# Patient Record
Sex: FEMALE | Race: BLACK OR AFRICAN AMERICAN | NOT HISPANIC OR LATINO | Employment: FULL TIME | URBAN - METROPOLITAN AREA
[De-identification: names, ages, dates, MRNs, and addresses within clinical notes are randomized per-mention and may not be internally consistent; named-entity substitution may affect disease eponyms.]

---

## 2018-01-05 ENCOUNTER — OFFICE VISIT CONVERTED (OUTPATIENT)
Dept: FAMILY MEDICINE CLINIC | Facility: CLINIC | Age: 23
End: 2018-01-05
Attending: FAMILY MEDICINE

## 2018-04-23 ENCOUNTER — OFFICE VISIT CONVERTED (OUTPATIENT)
Dept: INTERNAL MEDICINE | Facility: CLINIC | Age: 23
End: 2018-04-23
Attending: INTERNAL MEDICINE

## 2018-07-26 ENCOUNTER — OFFICE VISIT CONVERTED (OUTPATIENT)
Dept: GASTROENTEROLOGY | Facility: CLINIC | Age: 23
End: 2018-07-26
Attending: INTERNAL MEDICINE

## 2018-10-29 ENCOUNTER — OFFICE VISIT CONVERTED (OUTPATIENT)
Dept: INTERNAL MEDICINE | Facility: CLINIC | Age: 23
End: 2018-10-29
Attending: PHYSICIAN ASSISTANT

## 2018-10-29 ENCOUNTER — CONVERSION ENCOUNTER (OUTPATIENT)
Dept: INTERNAL MEDICINE | Facility: CLINIC | Age: 23
End: 2018-10-29

## 2018-11-29 ENCOUNTER — CONVERSION ENCOUNTER (OUTPATIENT)
Dept: GASTROENTEROLOGY | Facility: CLINIC | Age: 23
End: 2018-11-29

## 2018-11-29 ENCOUNTER — OFFICE VISIT CONVERTED (OUTPATIENT)
Dept: GASTROENTEROLOGY | Facility: CLINIC | Age: 23
End: 2018-11-29
Attending: INTERNAL MEDICINE

## 2019-01-02 ENCOUNTER — CONVERSION ENCOUNTER (OUTPATIENT)
Dept: INTERNAL MEDICINE | Facility: CLINIC | Age: 24
End: 2019-01-02

## 2019-01-02 ENCOUNTER — OFFICE VISIT CONVERTED (OUTPATIENT)
Dept: INTERNAL MEDICINE | Facility: CLINIC | Age: 24
End: 2019-01-02
Attending: INTERNAL MEDICINE

## 2019-04-24 ENCOUNTER — HOSPITAL ENCOUNTER (OUTPATIENT)
Dept: PERIOP | Facility: HOSPITAL | Age: 24
Setting detail: HOSPITAL OUTPATIENT SURGERY
Discharge: HOME | End: 2019-04-24
Attending: OBSTETRICS & GYNECOLOGY

## 2019-04-24 LAB — HCG UR QL: NEGATIVE

## 2019-05-08 ENCOUNTER — CONVERSION ENCOUNTER (OUTPATIENT)
Dept: INTERNAL MEDICINE | Facility: CLINIC | Age: 24
End: 2019-05-08

## 2019-05-08 ENCOUNTER — OFFICE VISIT CONVERTED (OUTPATIENT)
Dept: INTERNAL MEDICINE | Facility: CLINIC | Age: 24
End: 2019-05-08
Attending: INTERNAL MEDICINE

## 2019-05-16 ENCOUNTER — HOSPITAL ENCOUNTER (OUTPATIENT)
Dept: URGENT CARE | Facility: CLINIC | Age: 24
Discharge: HOME | End: 2019-05-16

## 2019-05-22 ENCOUNTER — OFFICE VISIT CONVERTED (OUTPATIENT)
Dept: INTERNAL MEDICINE | Facility: CLINIC | Age: 24
End: 2019-05-22
Attending: INTERNAL MEDICINE

## 2019-12-27 ENCOUNTER — OFFICE VISIT CONVERTED (OUTPATIENT)
Dept: INTERNAL MEDICINE | Facility: CLINIC | Age: 24
End: 2019-12-27
Attending: NURSE PRACTITIONER

## 2020-06-02 ENCOUNTER — TELEMEDICINE CONVERTED (OUTPATIENT)
Dept: INTERNAL MEDICINE | Facility: CLINIC | Age: 25
End: 2020-06-02
Attending: INTERNAL MEDICINE

## 2020-06-02 ENCOUNTER — HOSPITAL ENCOUNTER (OUTPATIENT)
Dept: OTHER | Facility: HOSPITAL | Age: 25
Discharge: HOME | End: 2020-06-02
Attending: INTERNAL MEDICINE

## 2020-06-23 ENCOUNTER — HOSPITAL ENCOUNTER (OUTPATIENT)
Dept: PREADMISSION TESTING | Facility: HOSPITAL | Age: 25
Discharge: HOME | End: 2020-06-23
Attending: INTERNAL MEDICINE

## 2020-06-24 LAB — SARS-COV-2 RNA SPEC QL NAA+PROBE: NOT DETECTED

## 2020-06-26 ENCOUNTER — HOSPITAL ENCOUNTER (OUTPATIENT)
Dept: CARDIOLOGY | Facility: HOSPITAL | Age: 25
Discharge: HOME | End: 2020-06-26
Attending: INTERNAL MEDICINE

## 2020-10-01 ENCOUNTER — HOSPITAL ENCOUNTER (OUTPATIENT)
Dept: OTHER | Facility: HOSPITAL | Age: 25
Discharge: HOME | End: 2020-10-01
Attending: NURSE PRACTITIONER

## 2020-10-01 ENCOUNTER — OFFICE VISIT CONVERTED (OUTPATIENT)
Dept: INTERNAL MEDICINE | Facility: CLINIC | Age: 25
End: 2020-10-01
Attending: NURSE PRACTITIONER

## 2020-10-01 ENCOUNTER — HOSPITAL ENCOUNTER (OUTPATIENT)
Dept: URGENT CARE | Facility: CLINIC | Age: 25
Discharge: HOME | End: 2020-10-01
Attending: NURSE PRACTITIONER

## 2020-10-01 ENCOUNTER — CONVERSION ENCOUNTER (OUTPATIENT)
Dept: INTERNAL MEDICINE | Facility: CLINIC | Age: 25
End: 2020-10-01

## 2020-10-05 LAB — SARS-COV-2 RNA SPEC QL NAA+PROBE: NOT DETECTED

## 2021-03-22 ENCOUNTER — HOSPITAL ENCOUNTER (OUTPATIENT)
Dept: URGENT CARE | Facility: CLINIC | Age: 26
Discharge: HOME | End: 2021-03-22
Attending: EMERGENCY MEDICINE

## 2021-04-06 ENCOUNTER — HOSPITAL ENCOUNTER (OUTPATIENT)
Dept: URGENT CARE | Facility: CLINIC | Age: 26
Discharge: HOME | End: 2021-04-06
Attending: NURSE PRACTITIONER

## 2021-04-08 ENCOUNTER — OFFICE VISIT CONVERTED (OUTPATIENT)
Dept: GASTROENTEROLOGY | Facility: CLINIC | Age: 26
End: 2021-04-08
Attending: NURSE PRACTITIONER

## 2021-05-02 ENCOUNTER — HOSPITAL ENCOUNTER (OUTPATIENT)
Dept: URGENT CARE | Facility: CLINIC | Age: 26
Discharge: HOME | End: 2021-05-02
Attending: FAMILY MEDICINE

## 2021-05-07 ENCOUNTER — OFFICE VISIT CONVERTED (OUTPATIENT)
Dept: INTERNAL MEDICINE | Facility: CLINIC | Age: 26
End: 2021-05-07
Attending: INTERNAL MEDICINE

## 2021-05-13 NOTE — PROGRESS NOTES
Progress Note      Patient Name: Sally Manuel   Patient ID: [id number]   Sex: Female   YOB: 1995    Primary Care Provider: Micheal Ochoa MD   Referring Provider: Micheal Ochoa MD    Visit Date: October 1, 2020    Provider: ATA ZHENG   Location: [facility]   Location Address: [address] [place] [address]  [id number]   Location Phone: [phone]          Chief Complaint  · Acute visit      History Of Present Illness  Sally Manuel is a 25 year old /Black female who presents for evaluation and treatment of:      Covid tested in June pre-procedural PFT   swabbed for flu+strep  Tmax, 99.2 yesterday   Cough that is dry, bodyaches.  happens every year she says.  Patient reports she works as a  at the [employer].     cough, congestion, runny nose, low grade fever, headaches, sore throat, ear pressure for about 1 week now.  Denies vomiting, diarrhea,  abdominal pain.  HX of asthma.     OTC medications ibuprofen   Asthma treatment include albuterol, breo, spirivia inhaler, singulair, certirizine prescribed by asthma doc.  No increase use of inhalers.       Past Medical History  Disease Name Date Onset Notes   Allergies --  --    Anxiety --  --    Arthritis --  --    Asthma --  --    Deafness --  --    Depression --  --    Epilepsy --  --    GERD --  --    Head injury --  --    Migraines --  --    Psychiatric Care --  --    Ringing in ears --  --    Seizures --  --    Shortness of breath --  --    STD exposure --  --    VSD (ventricular septal defect) --  --          Past Surgical History  Procedure Name Date Notes   Back surgery 2015 --    EGD 2017 --    heart surgery --  --    Knee surgery 01/2016 --    Metal implants --  --    Tracheostomy- --  --    VSD repair --  --          Medication List  Name Date Started Instructions   albuterol sulfate 2.5 mg /3 mL (0.083 %) inhalation solution for nebulization  2020 inhale 3 milliliters (2.5 mg) by nebulization route 4 times per day as needed.   Breo Ellipta 100-25 mcg/dose inhalation blister with device 2019 inhale 1 puff by inhalation route once daily at the same time each day   cetirizine 10 mg oral tablet 2020 take 1 tablet (10 mg) by oral route once daily for 90 days   cyclobenzaprine 10 mg oral tablet 2020 take 1 tablet (10 mg) by oral route 3 times per day for 30 days   hydroxyzine HCl 50 mg oral tablet 2019 take 1 tablet by oral route once a day (at bedtime) for 30 days   medroxyprogesterone 150 mg/mL intramuscular suspension 2020 inject 150 mg by intramuscular route every 3 months   meloxicam 7.5 mg oral tablet 2019 take 1 tablet (7.5 mg) by oral route once daily for 90 days   montelukast 10 mg oral tablet 2019 take 1 tablet (10 mg) by oral route once daily in the evening for 30 days   sertraline 50 mg oral tablet 2019 take 1 tablet by oral route once a day (at bedtime) for 30 days   Spiriva Respimat 2.5 mcg/actuation inhalation mist 2020 inhale 1 mist by inhalation route daily   Ventolin HFA 90 mcg/actuation inhalation HFA aerosol inhaler 2019 inhale 1 - 2 puffs (90 - 180 mcg) by inhalation route every 6 hours as needed for 30 days         Allergy List  Allergen Name Date Reaction Notes   NO KNOWN DRUG ALLERGIES --  --  --          Family Medical History  Disease Name Relative/Age Notes   Breast Neoplasm, Malignant  --    Stroke Mother/   --    Heart Disease Father/   --    Diabetes, unspecified type Father/  Mother/   --    Diabetes Mellitus, Type II  --          Reproductive History  Menstrual   Method of Birth Control: Depo Provera   Pregnancy Summary   Total Pregnancies: 0 Full Term: 0 Premature: 0   Ab Induced: 0 Ab Spontaneous: 0 Ectopics: 0   Multiples: 0 Livin         Social History  Finding Status Start/Stop Quantity Notes   Alcohol Current some day --/-- --  occassionally   Tobacco  "Current every day --/-- --  2 CIGARS/day         Immunizations  NameDate Admin Mfg Trade Name Lot Number Route Inj VIS Given VIS Publication   Hepatitis A10/29/2018 SKB HAVRIX-ADULT [id number] IM LD 10/29/2018 07/20/2016   Comments: patient tolerated well, left office in stable condition   Hepatitis A04/23/2018 SKB HAVRIX-ADULT  IM RD 04/23/2018 10/25/2011   Comments: tolerated well   Hyvnfxqez33/29/2018 SKB Fluzone Quadrivalent [id number] IM RD 10/29/2018 08/07/2015   Comments: patient tolerated well, left office in stable condition   Rufxzxjof6356/23/2018 WAL PNEUMOVAX 23 [id number] IM LD 04/23/2018 04/24/2015   Comments: tolerated well   Tdap05/22/2019 SKB BOOSTRIX [id number] IM RD 05/22/2019    Comments: Pt tolerated well and left office in stable condition         Review of Systems  · Constitutional  o Admits  o : fever, fatigue  o Denies  o : weight loss, weight gain  · HENT  o Admits  o : headaches, sinus pain, nasal congestion, sore throat, ear pain  o Denies  o : nasal discharge, dental problems, neck pain, ear fullness  · Cardiovascular  o Denies  o : lower extremity edema, claudication, chest pressure, palpitations  · Respiratory  o Admits  o : shortness of breath, wheezing, cough, dyspnea on exertion  o Denies  o : hemoptysis  · Gastrointestinal  o Admits  o : nausea  o Denies  o : vomiting, diarrhea, constipation, abdominal pain  · Integument  o Denies  o : rash      Vitals  Date Time BP Position Site L\R Cuff Size HR RR TEMP (F) WT  HT  BMI kg/m2 BSA m2 O2 Sat FR L/min FiO2        05/22/2019 08:16 /66 Sitting    93 - R  97.5 123lbs 2oz 5'  1\" 23.26 1.55 98 %      12/27/2019 11:19 AM 94/70 Sitting    121 - R  99 126lbs 16oz 5'  1\" 24 1.57 99 %      10/01/2020 01:18 /66 Sitting    89 - R 16 98.4 120lbs 0oz 5'  1\" 22.67 1.53 100 %  21%          Physical Examination  · Constitutional  o Appearance  o : no acute distress, well-nourished  · Head and Face  o Head  o :   § Inspection  § : atraumatic, " normocephalic  · Eyes  o Eyes  o : extraocular movements intact, no scleral icterus, no conjunctival injection  · Ears, Nose, Mouth and Throat  o Ears  o :   § External Ears  § : normal  § Otoscopic Examination  § : tympanic membrane appearance within normal limits bilaterally  o Nose  o :   § Intranasal Exam  § : nares patent  o Oral Cavity  o :   § Oral Mucosa  § : moist mucous membranes  o Throat  o :   § Oropharynx  § : no inflammation or lesions present, tonsils within normal limits  · Respiratory  o Respiratory Effort  o : breathing comfortably, symmetric chest rise  o Auscultation of Lungs  o : wheeze diffusely throughout the lungs  · Cardiovascular  o Heart  o :   § Auscultation of Heart  § : regular rate and rhythm, no murmurs, rubs, or gallops  o Peripheral Vascular System  o :   § Extremities  § : no edema  · Gastrointestinal  o Abdominal Examination  o :   § Abdomen  § : bowel sounds present, non-distended, non-tender  · Lymphatic  o Neck  o : no lymphadenopathy present  o Supraclavicular Nodes  o : no supraclavicular nodes  · Skin and Subcutaneous Tissue  o General Inspection  o : no lesions present, no areas of discoloration, skin turgor normal  · Neurologic  o Mental Status Examination  o :   § Orientation  § : grossly oriented to person, place and time  o Gait and Station  o :   § Gait Screening  § : normal gait  · Psychiatric  o General  o : normal mood and affect          Results  · In-Office Procedures  o Lab procedure  § IOP - Rapid Strep (08664)   § IOP - Influenza A/B Test (25244)       Assessment  · Cough     786.2/R05  Patient presented with symptoms of viral respiratory infection. Advised to drink plenty of fluids, run a cool-mist humidifier in room at night with a clean filter, gargle salt water for sore throat, and get plenty of rest. Patient should avoid over-exertion and reduce exposure to irritants such as smoke, cold, dry air, and dust.  Treatment currently involves symptomatic relief.  Patient may take acetaminophen or ibuprofen as directed to reduce fever and body aches. Antihistamine and decongestant usage was discussed and recommendations made.  Patient understood these instructions and will follow up in the office if symptoms not improving.   Cough medication prescribed at this time. Educated patient that I cannot exclude Covid-19 as a diagnosis. Patient will be sent for covid-19 testing. Patient will be notified of results once received. Educated patient on self quarantine and self-monitoring. given work note and advised to call or be seen with any new or worsening symptoms   · Sore throat     462/J02.9  · Asthma exacerbation     493.92/J45.901  solumedrol 125 mg in office, will start steroids tomorrow. Educated to continue asthma regimen. Xray in office normal. Negative flu and strep.     Problems Reconciled  Plan  · Orders  o Chest xray 2 views [facility] ([id number]) - 786.2/R05 - 10/01/2020  o ACO-39: Current medications updated and reviewed (, 1159F) - - 10/01/2020  o [facility] NCOV2 (send-out) (66306) - 786.2/R05, 462/J02.9 - 10/01/2020  o IM/SQ - Injection Fee [facility] ([id number]) - 786.2/R05, 462/J02.9, 493.92/J45.901 - 10/01/2020  o Solu-Medrol Injection 125mg () - 786.2/R05, 462/J02.9, 493.92/J45.901 - 10/01/2020   Injection - Solu-Medrol 125 mg; Dose: 125mg; Site: Right Hip; Route: intramuscular; Date: 10/01/2020 14:20:00; Exp: 07/01/2022; Lot: [id number]; Mfg: [employer]; TradeName: Solu-Medrol; Location: [facility]; Administered By: Minna Mccoy MA; Comment: Pt tolerated well. Left the office in stable condition. FANG CHARLES.  · Medications  o prednisone 20 mg oral tablet   SIG: take 1 tablet (20 mg) by oral route 2 times per day for 5 days   DISP: (10) Tablet with 0 refills  Prescribed on 10/01/2020     o Medications have been Reconciled  o Transition of Care or Provider Policy  · Instructions  o Rest. Increase Fluids.  o Patient was  educated/instructed on their diagnosis, treatment and medications prior to discharge from the clinic today.  o Call the office with any concerns or questions.  · Disposition  o Call or Return if symptoms worsen or persist.  o Meds sent to pharmacy  o As Needed for Follow Up            Electronically Signed by: MARCELINO STARKEY APRN -Author on October 3, 2020 12:26:19 PM

## 2021-05-13 NOTE — PROGRESS NOTES
Progress Note      Patient Name: Sally Manuel   Patient ID: [id number]   Sex: Female   YOB: 1995    Primary Care Provider: Micheal Ochoa MD   Referring Provider: Micheal Ochoa MD    Visit Date: June 2, 2020    Provider: Micheal Ochoa MD   Location: [facility]   Location Address: [address] [place] [address]  [id number]   Location Phone: [phone]          Chief Complaint  · Concerns about returning to work      History Of Present Illness  Video Conferencing Visit  Sally Manuel is a 24 year old /Black female who is presenting for evaluation via video conferencing via Zoom. Verbal consent obtained before beginning visit.   The following staff were present during this visit: Dr. Ochoa and pt   Sally Manuel is a 24 year old /Black female who presents for evaluation and treatment of:      asthma- pt reports right-sided rib pain that she notices worst with deep breaths and certain movements. pt denies any recent illness. pt reports feeling more SUMMERS. pt is trying quit smoking. pt also reports inc coughing recently. pt cont to use breo daily. pt uses Ventolin inhaler when she goes outside.     seasonal allergies - pt is on singulair, but not currently on antihistamine.   anxiety- doing well on sertraline. pt denies HI and SI. pt reports family situation has improved.       Past Medical History  Disease Name Date Onset Notes   Allergies --  --    Anxiety --  --    Arthritis --  --    Asthma --  --    Deafness --  --    Depression --  --    Epilepsy --  --    GERD --  --    Head injury --  --    Migraines --  --    Psychiatric Care --  --    Ringing in ears --  --    Seizures --  --    Shortness of breath --  --    STD exposure --  --    VSD (ventricular septal defect) --  --          Past Surgical History  Procedure Name Date Notes   Back surgery 2015 --    EGD 2017 --    heart surgery --  --    Knee surgery 01/2016  --    Metal implants --  --    Tracheostomy- --  --    VSD repair --  --          Medication List  Name Date Started Instructions   albuterol sulfate 2.5 mg /3 mL (0.083 %) inhalation solution for nebulization 2020 inhale 3 milliliters (2.5 mg) by nebulization route 4 times per day as needed.   Breo Ellipta 100-25 mcg/dose inhalation blister with device 2019 inhale 1 puff by inhalation route once daily at the same time each day   cyclobenzaprine 10 mg oral tablet 2020 take 1 tablet (10 mg) by oral route 3 times per day for 30 days   hydroxyzine HCl 50 mg oral tablet 2019 take 1 tablet by oral route once a day (at bedtime) for 30 days   medroxyprogesterone 150 mg/mL intramuscular suspension 2020 inject 150 mg by intramuscular route every 3 months   meloxicam 7.5 mg oral tablet 2019 take 1 tablet (7.5 mg) by oral route once daily for 90 days   montelukast 10 mg oral tablet 2019 take 1 tablet (10 mg) by oral route once daily in the evening for 30 days   sertraline 50 mg oral tablet 2019 take 1 tablet by oral route once a day (at bedtime) for 30 days   Ventolin HFA 90 mcg/actuation inhalation HFA aerosol inhaler 2019 inhale 1 - 2 puffs (90 - 180 mcg) by inhalation route every 6 hours as needed for 30 days   Zantac 150 mg oral tablet 2019 take 1 tablet (150 mg) by oral route once daily at bedtime         Allergy List  Allergen Name Date Reaction Notes   NO KNOWN DRUG ALLERGIES --  --  --          Family Medical History  Disease Name Relative/Age Notes   Breast Neoplasm, Malignant  --    Stroke Mother/   --    Heart Disease Father/   --    Diabetes, unspecified type Father/  Mother/   --    Diabetes mellitus, type II  --          Reproductive History  Menstrual   Method of Birth Control: Depo Provera   Pregnancy Summary   Total Pregnancies: 0 Full Term: 0 Premature: 0   Ab Induced: 0 Ab Spontaneous: 0 Ectopics: 0   Multiples: 0 Livin         Social  History  Finding Status Start/Stop Quantity Notes   Alcohol Current some day --/-- --  occassionally   Tobacco Current every day --/-- --  2 CIGARS/day         Immunizations  NameDate Admin Mfg Trade Name Lot Number Route Inj VIS Given VIS Publication   Hepatitis A10/29/2018 SKB HAVRIX-ADULT [id number] IM LD 10/29/2018 07/20/2016   Comments: patient tolerated well, left office in stable condition   Hepatitis A04/23/2018 SKB HAVRIX-ADULT  IM RD 04/23/2018 10/25/2011   Comments: tolerated well   Xxzezwmwq35/29/2018 SKB Fluzone Quadrivalent [id number] IM RD 10/29/2018 08/07/2015   Comments: patient tolerated well, left office in stable condition   Hbzmgumti5678/23/2018 WAL PNEUMOVAX 23 [id number] IM LD 04/23/2018 04/24/2015   Comments: tolerated well   Tdap05/22/2019 SKB BOOSTRIX [id number] IM RD 05/22/2019    Comments: Pt tolerated well and left office in stable condition         Review of Systems  · Constitutional  o Denies  o : fever, fatigue, weight loss, weight gain  · Cardiovascular  o Denies  o : lower extremity edema, chest pressure, palpitations  · Respiratory  o Admits  o : frequent cough, dyspnea on exertion  o Denies  o : wheezing  · Gastrointestinal  o Denies  o : nausea, vomiting, diarrhea, constipation, abdominal pain      Physical Examination     Gen: well-nourished, no acute distress  HENT: atraumatic, normocephalic  Eyes: extraocular movements intact, no scleral icterus  Lung: breathing comfortably, no cough  Neuro: grossly oriented to person, place, and time. no facial droop   Psych: normal mood and affect             Assessment  · Anxiety     300.00/F41.9  currently doing well on sertraline. cont regimen.   · Asthma     493.90/J45.909  given chest tightness, will obtain PFT to further eval control. consider addition of Spiriva.   · Allergic rhinitis due to allergen     477.9/J30.9  will Rx Zyrtec to add to singulair.   · Chest pain     786.50/R07.9  thought less likely to be cardiac in nature given history and  age. check CXR and PFT to further eval.    Problems Reconciled  Plan  · Orders  o PFT (Pre and Post Broncho-Dilation) [facility] ([id number]) - 493.90/J45.909 - 06/02/2020  o Chest xray 2 views [facility] ([id number]) - 786.50/R07.9 - 06/02/2020   Done in clinic  o ACO-39: Current medications updated and reviewed () - - 06/02/2020  · Medications  o cetirizine 10 mg oral tablet   SIG: take 1 tablet (10 mg) by oral route once daily for 90 days   DISP: (90) tablets with 3 refills  Prescribed on 06/02/2020     o cyclobenzaprine 10 mg oral tablet   SIG: take 1 tablet (10 mg) by oral route 3 times per day for 30 days   DISP: (30) Tablet with 1 refills  Refilled on 06/02/2020     o Medications have been Reconciled  o Transition of Care or Provider Policy  · Instructions  o Patient was educated/instructed on their diagnosis, treatment and medications prior to discharge from the clinic today.  · Disposition  o Call or Return if symptoms worsen or persist.            Electronically Signed by: Micheal Ochoa MD -Author on June 2, 2020 01:20:49 PM

## 2021-05-14 VITALS
DIASTOLIC BLOOD PRESSURE: 82 MMHG | HEART RATE: 104 BPM | HEIGHT: 61 IN | SYSTOLIC BLOOD PRESSURE: 123 MMHG | BODY MASS INDEX: 21.52 KG/M2 | WEIGHT: 114 LBS

## 2021-05-14 VITALS
DIASTOLIC BLOOD PRESSURE: 66 MMHG | TEMPERATURE: 98.4 F | BODY MASS INDEX: 22.66 KG/M2 | WEIGHT: 120 LBS | SYSTOLIC BLOOD PRESSURE: 122 MMHG | HEIGHT: 61 IN | RESPIRATION RATE: 16 BRPM | HEART RATE: 89 BPM | OXYGEN SATURATION: 100 %

## 2021-05-14 NOTE — PROGRESS NOTES
"   Progress Note      Patient Name: Sally Manuel   Patient ID: [id number]   Sex: Female   YOB: 1995    Primary Care Provider: Micheal Ochoa MD   Referring Provider: Micheal Ochoa MD    Visit Date: April 8, 2021    Provider: ATA Gomez   Location: [facility]   Location Address: [address] [place] [address], [address] [place] [address]  [id number]   Location Phone: [phone]          Chief Complaint  · Follow up GERD      History Of Present Illness     Ms. Manuel reports that she has a history of GERD. Admits that she is not the best with her diet as she eats a lot of greasy/fatty foods. Would like help today with knowing what \"a good diet is\". Epigastric burning on and off depending on what she eats. 10lb weight loss in the last year. Occasional nausea without vomiting. Eating 2 meals a day due to early satiety. Increased belching here lately. Epigastric pain, waxes and wanes. \"eating bad makes the pain worse\".  Denies NSAID usage.    Colonoscopy 12/14/2018: Normal mucosa in the whole colon.    Gastric emptying study 7/31/2018: Normal gastric emptying.    EGD (08/02/2017): Normal esophagus, scarring visualized at 24 cm, erythema in the gastric antrum and body, scar visualized gastric body consistent with previous history of gastrostomy, normal duodenum. Antrum biopsies normal, no H. pylori, gastric body biopsy with mild chronic inactive gastritis, no H. pylori, distal esophagus biopsy normal, esophagus biopsy with mild active inflammation.            Past Medical History  Allergies; Anxiety; Arthritis; Asthma; Deafness; Depression; Epilepsy; GERD; Head injury; Migraines; Psychiatric Care; Ringing in ears; Seizures; Shortness of breath; STD exposure; VSD (ventricular septal defect)         Past Surgical History  Back surgery; EGD; heart surgery; Knee surgery; Metal implants; Tracheostomy-; VSD repair         Medication List  albuterol sulfate 2.5 mg /3 mL (0.083 " "%) inhalation solution for nebulization; Breo Ellipta 100-25 mcg/dose inhalation blister with device; cetirizine 10 mg oral tablet; cyclobenzaprine 10 mg oral tablet; hydroxyzine HCl 50 mg oral tablet; medroxyprogesterone 150 mg/mL intramuscular suspension; meloxicam 7.5 mg oral tablet; montelukast 10 mg oral tablet; prednisone 20 mg oral tablet; sertraline 50 mg oral tablet; Spiriva Respimat 2.5 mcg/actuation inhalation mist; Ventolin HFA 90 mcg/actuation inhalation HFA aerosol inhaler         Allergy List  NO KNOWN DRUG ALLERGIES       Allergies Reconciled  Family Medical History  Breast Neoplasm, Malignant; Stroke; Heart Disease; Diabetes, unspecified type; Diabetes Mellitus, Type II         Reproductive History   0 Para 0 0 0 0       Social History  Alcohol (Current some day); Tobacco (Current every day)         Immunizations  Name Date Admin   Hepatitis A 10/29/2018   Hepatitis A 2018   Influenza 10/29/2018   Wvutfkfto32 2018   Tdap 2019         Review of Systems  · Constitutional  o Denies  o : chills, fever  · Cardiovascular  o Denies  o : chest pain, dyspnea on exertion  · Respiratory  o Denies  o : cough, shortness of breath  · Gastrointestinal  o Admits  o : see HPI   · Endocrine  o Denies  o : weight gain, weight loss      Vitals  Date Time BP Position Site L\R Cuff Size HR RR TEMP (F) WT  HT  BMI kg/m2 BSA m2 O2 Sat FR L/min FiO2 HC       2021 02:47 /82 Sitting    104 - R   114lbs 0oz 5'  1\" 21.54 1.49             Physical Examination  · Constitutional  o Appearance  o : Healthy-appearing, awake and alert in no acute distress  · Head and Face  o Head  o : Normocephalic with no worriesome skin lesions  · Eyes  o Vision  o :   § Visual Fields  § : eyes move symmetrical in all directions  o Sclerae  o : sclerae anicteric  o Pupils and Irises  o : pupils equal and symmetrical  · Neck  o Inspection/Palpation  o : Trachea is midline, no " adenopathy  · Respiratory  o Respiratory Effort  o : Breathing is unlabored.  o Inspection of Chest  o : normal appearance  o Auscultation of Lungs  o : Chest is clear to auscultation bilaterally.  · Cardiovascular  o Heart  o :   § Auscultation of Heart  § : no murmurs, rubs, or gallops  o Peripheral Vascular System  o :   § Extremities  § : no cyanosis, clubbing or edema;   · Gastrointestinal  o Abdominal Examination  o : Abdomen is soft, nontender to palpation, with normal active bowel sounds, no appreciable hepatosplenomegaly.  o Digital Rectal Exam  o : deferred  · Skin and Subcutaneous Tissue  o General Inspection  o : without focal lesions; turgor is normal  · Psychiatric  o General  o : Alert and oriented x3  o Mood and Affect  o : Mood and affect are appropriate to circumstances          Assessment  · Gastroesophageal Reflux     530.81/K21.9  · Nausea     787.02/R11.0  · Poor eating habits     269.9/E63.9      Plan  · Orders  o DIETERY CONSULTATION (DIETE) - - 04/08/2021  · Medications  o Protonix 20 mg oral tablet,delayed release (DR/EC)   SIG: take 1 tablet (20 mg) by oral route once daily for 30 days   DISP: (30) Tablet with 3 refills  Prescribed on 04/08/2021     o Medications have been Reconciled  · Instructions  o Information given on current diagnoses.  o Lifestyle modifications discussed.  o Discussed risks of long-term PPI use.  o Electronically Identified Patient Education Materials Provided Electronically  · Disposition  o 3 month f/u            Electronically Signed by: ATA Gomez -Author on April 8, 2021 03:15:39 PM

## 2021-05-15 VITALS
OXYGEN SATURATION: 97 % | DIASTOLIC BLOOD PRESSURE: 82 MMHG | BODY MASS INDEX: 23.79 KG/M2 | SYSTOLIC BLOOD PRESSURE: 136 MMHG | HEIGHT: 61 IN | WEIGHT: 126 LBS | HEART RATE: 85 BPM | TEMPERATURE: 97.2 F

## 2021-05-15 VITALS
BODY MASS INDEX: 24.26 KG/M2 | DIASTOLIC BLOOD PRESSURE: 48 MMHG | HEIGHT: 61 IN | TEMPERATURE: 98.2 F | SYSTOLIC BLOOD PRESSURE: 104 MMHG | WEIGHT: 128.5 LBS | HEART RATE: 100 BPM | OXYGEN SATURATION: 97 %

## 2021-05-15 VITALS
DIASTOLIC BLOOD PRESSURE: 70 MMHG | HEART RATE: 121 BPM | WEIGHT: 127 LBS | OXYGEN SATURATION: 99 % | TEMPERATURE: 99 F | HEIGHT: 61 IN | SYSTOLIC BLOOD PRESSURE: 94 MMHG | BODY MASS INDEX: 23.98 KG/M2

## 2021-05-15 VITALS
DIASTOLIC BLOOD PRESSURE: 66 MMHG | WEIGHT: 123.12 LBS | HEIGHT: 61 IN | TEMPERATURE: 97.5 F | BODY MASS INDEX: 23.25 KG/M2 | OXYGEN SATURATION: 98 % | SYSTOLIC BLOOD PRESSURE: 102 MMHG | HEART RATE: 93 BPM

## 2021-05-16 VITALS
BODY MASS INDEX: 23.99 KG/M2 | SYSTOLIC BLOOD PRESSURE: 90 MMHG | TEMPERATURE: 97.9 F | OXYGEN SATURATION: 98 % | DIASTOLIC BLOOD PRESSURE: 62 MMHG | HEART RATE: 117 BPM | WEIGHT: 130.37 LBS | HEIGHT: 62 IN | RESPIRATION RATE: 16 BRPM

## 2021-05-16 VITALS
DIASTOLIC BLOOD PRESSURE: 62 MMHG | SYSTOLIC BLOOD PRESSURE: 102 MMHG | BODY MASS INDEX: 24.26 KG/M2 | WEIGHT: 128.5 LBS | HEIGHT: 61 IN

## 2021-05-16 VITALS
DIASTOLIC BLOOD PRESSURE: 64 MMHG | OXYGEN SATURATION: 98 % | TEMPERATURE: 97.6 F | HEART RATE: 85 BPM | WEIGHT: 122.12 LBS | HEIGHT: 61 IN | SYSTOLIC BLOOD PRESSURE: 110 MMHG | BODY MASS INDEX: 23.06 KG/M2

## 2021-05-16 VITALS
SYSTOLIC BLOOD PRESSURE: 121 MMHG | DIASTOLIC BLOOD PRESSURE: 63 MMHG | WEIGHT: 118.12 LBS | HEIGHT: 62 IN | BODY MASS INDEX: 21.74 KG/M2

## 2021-05-16 VITALS
HEART RATE: 87 BPM | RESPIRATION RATE: 16 BRPM | OXYGEN SATURATION: 100 % | WEIGHT: 129 LBS | HEIGHT: 61 IN | TEMPERATURE: 97 F | BODY MASS INDEX: 24.35 KG/M2 | DIASTOLIC BLOOD PRESSURE: 67 MMHG | SYSTOLIC BLOOD PRESSURE: 116 MMHG

## 2021-05-18 ENCOUNTER — HOSPITAL ENCOUNTER (OUTPATIENT)
Dept: INTERNAL MEDICINE | Facility: CLINIC | Age: 26
Discharge: HOME | End: 2021-05-18
Attending: INTERNAL MEDICINE

## 2021-05-18 ENCOUNTER — OFFICE VISIT CONVERTED (OUTPATIENT)
Dept: INTERNAL MEDICINE | Facility: CLINIC | Age: 26
End: 2021-05-18
Attending: INTERNAL MEDICINE

## 2021-05-19 LAB
ALBUMIN SERPL-MCNC: 4.4 G/DL (ref 3.5–5)
ALBUMIN/GLOB SERPL: 1.3 {RATIO} (ref 1.4–2.6)
ALP SERPL-CCNC: 76 U/L (ref 42–98)
ALT SERPL-CCNC: 36 U/L (ref 10–40)
ANION GAP SERPL CALC-SCNC: 15 MMOL/L (ref 8–19)
AST SERPL-CCNC: 27 U/L (ref 15–50)
BASOPHILS # BLD AUTO: 0.03 10*3/UL (ref 0–0.2)
BASOPHILS NFR BLD AUTO: 0.2 % (ref 0–3)
BILIRUB SERPL-MCNC: 0.32 MG/DL (ref 0.2–1.3)
BUN SERPL-MCNC: 8 MG/DL (ref 5–25)
BUN/CREAT SERPL: 9 {RATIO} (ref 6–20)
CALCIUM SERPL-MCNC: 9.4 MG/DL (ref 8.7–10.4)
CHLORIDE SERPL-SCNC: 100 MMOL/L (ref 99–111)
CHOLEST SERPL-MCNC: 185 MG/DL (ref 107–200)
CHOLEST/HDLC SERPL: 2.5 {RATIO} (ref 3–6)
CONV ABS IMM GRAN: 0.08 10*3/UL (ref 0–0.2)
CONV CO2: 25 MMOL/L (ref 22–32)
CONV IMMATURE GRAN: 0.5 % (ref 0–1.8)
CONV TOTAL PROTEIN: 7.8 G/DL (ref 6.3–8.2)
CREAT UR-MCNC: 0.85 MG/DL (ref 0.5–0.9)
DEPRECATED RDW RBC AUTO: 44.1 FL (ref 36.4–46.3)
EOSINOPHIL # BLD AUTO: 0.03 10*3/UL (ref 0–0.7)
EOSINOPHIL # BLD AUTO: 0.2 % (ref 0–7)
ERYTHROCYTE [DISTWIDTH] IN BLOOD BY AUTOMATED COUNT: 12.6 % (ref 11.7–14.4)
GFR SERPLBLD BASED ON 1.73 SQ M-ARVRAT: >60 ML/MIN/{1.73_M2}
GLOBULIN UR ELPH-MCNC: 3.4 G/DL (ref 2–3.5)
GLUCOSE SERPL-MCNC: 80 MG/DL (ref 65–99)
HCT VFR BLD AUTO: 42.7 % (ref 37–47)
HDLC SERPL-MCNC: 73 MG/DL (ref 40–60)
HGB BLD-MCNC: 14.2 G/DL (ref 12–16)
LDLC SERPL CALC-MCNC: 102 MG/DL (ref 70–100)
LYMPHOCYTES # BLD AUTO: 3.3 10*3/UL (ref 1–5)
LYMPHOCYTES NFR BLD AUTO: 19.5 % (ref 20–45)
MAGNESIUM SERPL-MCNC: 2.42 MG/DL (ref 1.6–2.3)
MCH RBC QN AUTO: 31.9 PG (ref 27–31)
MCHC RBC AUTO-ENTMCNC: 33.3 G/DL (ref 33–37)
MCV RBC AUTO: 96 FL (ref 81–99)
MONOCYTES # BLD AUTO: 1.15 10*3/UL (ref 0.2–1.2)
MONOCYTES NFR BLD AUTO: 6.8 % (ref 3–10)
NEUTROPHILS # BLD AUTO: 12.33 10*3/UL (ref 2–8)
NEUTROPHILS NFR BLD AUTO: 72.8 % (ref 30–85)
NRBC CBCN: 0 % (ref 0–0.7)
OSMOLALITY SERPL CALC.SUM OF ELEC: 277 MOSM/KG (ref 273–304)
PLATELET # BLD AUTO: 421 10*3/UL (ref 130–400)
PMV BLD AUTO: 10.1 FL (ref 9.4–12.3)
POTASSIUM SERPL-SCNC: 4.6 MMOL/L (ref 3.5–5.3)
RBC # BLD AUTO: 4.45 10*6/UL (ref 4.2–5.4)
SODIUM SERPL-SCNC: 135 MMOL/L (ref 135–147)
TRIGL SERPL-MCNC: 52 MG/DL (ref 40–150)
TSH SERPL-ACNC: 1.2 M[IU]/L (ref 0.27–4.2)
VLDLC SERPL-MCNC: 10 MG/DL (ref 5–37)
WBC # BLD AUTO: 16.92 10*3/UL (ref 4.8–10.8)

## 2021-06-05 NOTE — PROGRESS NOTES
"   Progress Note      Patient Name: Sally Manuel   Patient ID: [id number]   Sex: Female   YOB: 1995    Primary Care Provider: Micheal Ochoa MD   Referring Provider: Micheal Ochoa MD    Visit Date: May 7, 2021    Provider: Micheal Ochoa MD   Location: [facility]   Location Address: [address] [place] [address]; [address] [place] [address]  [id number]   Location Phone: [phone]          Chief Complaint  · \" Been to urgent care on Sunday 5/2/21- diagnosed with shoulder strain\"      History Of Present Illness  Sally Manuel is a 25 year old /Black female who presents for evaluation and treatment of:      pt reports proximal LUE pain and axillary pain x1 week. pt does not want to move her arm due to the pain. pain does not radiate past elbow. pain is not really in neck either. pt does report some numbness/tingling in her left fingers. pt has new typing job. pt reports being \"lazy\" and does not do any heavy lifting. pt had been on diclofenac x1 week with some pain relief. but cont to have tightness.  pt has tried flexeril without any relief. pt has been painting some.       Vitals  Date Time BP Position Site L\R Cuff Size HR RR TEMP (F) WT  HT  BMI kg/m2 BSA m2 O2 Sat FR L/min FiO2 HC       10/01/2020 01:18 /66 Sitting    89 - R 16 98.4 120lbs 0oz 5'  1\" 22.67 1.53 100 %  21%    04/08/2021 02:47 /82 Sitting    104 - R   114lbs 0oz 5'  1\" 21.54 1.49       05/07/2021 11:41 /76 Sitting    82 - R  98.1 120lbs 0oz 5'  1\" 22.67 1.53 98 %  21%          Physical Examination  · Constitutional  o Appearance  o : no acute distress, well-nourished  · Head and Face  o Head  o :   § Inspection  § : atraumatic, normocephalic  · Eyes  o Eyes  o : extraocular movements intact, no scleral icterus, no conjunctival injection  · Ears, Nose, Mouth and Throat  o Ears  o :   § External Ears  § : normal  o Nose  o :   § Intranasal Exam  § : nares " patent  o Oral Cavity  o :   § Oral Mucosa  § : moist mucous membranes  · Respiratory  o Respiratory Effort  o : breathing comfortably, symmetric chest rise  o Auscultation of Lungs  o : clear to asculatation bilaterally, no wheezes, rales, or rhonchii  · Cardiovascular  o Heart  o :   § Auscultation of Heart  § : regular rate and rhythm, no murmurs, rubs, or gallops  o Peripheral Vascular System  o :   § Extremities  § : no edema  · Neurologic  o Mental Status Examination  o :   § Orientation  § : grossly oriented to person, place and time  o Gait and Station  o :   § Gait Screening  § : normal gait  · Psychiatric  o General  o : normal mood and affect  · IMPShoulder Injection  o Procedure info  o : Informed consent obtained. Patient was informed of possible adverse effects including but not limited to bleeding, damage to nerve, tendon or artery, increased blood sugar, or increased blood pressure. They were instructed to call if they have any concerns or questions. Time out performed. Patient placed with left shoulder passively hanging from side of body at 0 degrees. Lateral edgeof scapular spine was palpated and site was marked just inferior to this, to proceed with injection per typical posterior approach. Betadine was swabbed at injection site per typical technique. 22 ga. 1.5 inch needle injected into bursa, directed slightly medially, aspiration was performed and then 80mg Kenalog and 4 mL of 1% Lidocaine without Epi was slowly injected into joint space, fluid was free flowing. Needle was removed, betadine wiped off and band-aid applied to injection site.           Assessment  · Left shoulder pain     719.41/M25.512  concern for rotator cuff injury/tendonitis. pt given intra-articular steroid injection in clinic today after discussion of risks and benefits. pt tolerated well and left clinic in stable condition. call or RTC if no improvement in 3-4 days or other concerns.     Problems  Reconciled  Plan  · Orders  o ACO-39: Current medications updated and reviewed (, 1159F) - - 05/07/2021  o Triamcinolone acet inj NOS () - - 05/07/2021   Injection - Kenalog 10mg; Dose: 2.0 ml; Site: Left Shoulder; Route: intra-articular; Date: 05/07/2021 12:49:53; Exp: 09/01/2022; Lot: [id number]; Mfg: [employer]; TradeName: triamcinolone acetonide; [facility] [facility]; Administered By: Anabel Bowers MA; Comment: Pt Tolerated well, left in stable condition. Injection administered by Dr. OSMANI Ochoa. 2 ml Triamcinolone + 4 ml of 1% Lidocaine   Injection - Kenalog 10mg; Dose: 2.0 ml; Site: Left Shoulder; Route: intra-articular; Date: 05/07/2021 12:49:53; Exp: 09/01/2022; Lot: [id number]; Mfg: [employer]NEAL [employer]ARMACE; TradeName: triamcinolone acetonide; [place] [facility]; Administered By: Anabel Bowers MA; Comment: Pt Tolerated well, left in stable condition. Injection administered by Dr. OSMANI Ochoa. 2 ml Triamcinolone + 4 ml of 1% Lidocaine   Injection - Kenalog 10mg; Dose: 2.0 ml; Site: Left Shoulder; Route: intra-articular; Date: 05/07/2021 12:49:53; Exp: 09/01/2022; Lot: [id number]; Mfg: AMNEAL [employer]ACE; TradeName: triamcinolone acetonide; [place] [facility]; Administered By: Anabel Bowers MA; Comment: Pt Tolerated well, left in stable condition. Injection administered by Dr. OSMANI Ochoa. 2 ml Triamcinolone + 4 ml of 1% Lidocaine  o Triamcinolone acet inj NOS () - - 05/07/2021  o Shoulder Intra-articular Injection without US Guidance [facility] (71279) - 719.41/M25.512 - 05/11/2021  · Medications  o meloxicam 7.5 mg oral tablet   SIG: take 1 tablet (7.5 mg) by oral route once daily for 90 days   DISP: (90) tablet with 0 refills  Discontinued on 05/07/2021     o Medications have been Reconciled  o Transition of Care or Provider Policy  · Instructions  o Patient was educated/instructed on their  diagnosis, treatment and medications prior to discharge from the clinic today.  · Disposition  o Call or Return if symptoms worsen or persist.            Electronically Signed by: Micheal Ochoa MD -Author on May 11, 2021 06:48:50 AM

## 2021-06-05 NOTE — PROGRESS NOTES
"   Progress Note      Patient Name: Sally Manuel   Patient ID: [id number]   Sex: Female   YOB: 1995    Primary Care Provider: Micheal Ochoa MD   Referring Provider: Micheal Ochoa MD    Visit Date: May 18, 2021    Provider: Micheal Ochoa MD   Location: [facility] [place]   Location Address: [address] [place] [address]; [address] [place] [address]  [address]   Location Phone: [phone]          Chief Complaint  · 1 week follow up   · \"needs RX for neb supplies\"  · \"left arm seems to be an issue\"      History Of Present Illness  Sally Manuel is a 25 year old /Black female who presents for evaluation and treatment of:      pt reports she continue to have a burning pain in her shoulder, but it has more ROM with less pain than previously. pt is taking diclofenac and icy hot to help woth discomfort. pt reports her left hand has been bothering her more often with hand cramps. 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 pt reports previously left hand x-ray and Dx'd with sprain. pt has been using a brace and heating pad for her hand. concerned for RA in knee from another specialist per pt report. pt reports having inc stressors recently with brother moving into living arrangements.       Allergy List    Allergies Reconciled  Vitals  Date Time BP Position Site L\R Cuff Size HR RR TEMP (F) WT  HT  BMI kg/m2 BSA m2 O2 Sat FR L/min FiO2 HC       12/27/2019 11:19 AM 94/70 Sitting    121 - R  99 126lbs 16oz 5'  1\" 24 1.57 99 %      10/01/2020 01:18 /66 Sitting    89 - R 16 98.4 120lbs 0oz 5'  1\" 22.67 1.53 100 %  21%    04/08/2021 02:47 /82 Sitting    104 - R   114lbs 0oz 5'  1\" 21.54 1.49       05/07/2021 11:41 /76 Sitting    82 - R  98.1 120lbs 0oz 5'  1\" 22.67 1.53 98 %  21%    05/18/2021 09:26 /85 Sitting    89 - R   116lbs 16oz 5'  1\" 22.11 1.51 98 %  21%          Physical " Examination  · Constitutional  o Appearance  o : no acute distress, well-nourished  · Head and Face  o Head  o :   § Inspection  § : atraumatic, normocephalic  · Eyes  o Eyes  o : extraocular movements intact, no scleral icterus, no conjunctival injection  · Ears, Nose, Mouth and Throat  o Ears  o :   § External Ears  § : normal  o Nose  o :   § Intranasal Exam  § : nares patent  o Oral Cavity  o :   § Oral Mucosa  § : moist mucous membranes  · Respiratory  o Respiratory Effort  o : breathing comfortably, symmetric chest rise  · Cardiovascular  o Heart  o :   § Auscultation of Heart  § : regular rate  o Peripheral Vascular System  o :   § Extremities  § : no edema  · Neurologic  o Mental Status Examination  o :   § Orientation  § : grossly oriented to person, place and time  o Gait and Station  o :   § Gait Screening  § : normal gait  · Psychiatric  o General  o : normal mood and affect          Assessment  · Screening for lipid disorders     V77.91/Z13.220  · Shoulder pain, left     719.41/M25.512  refer to orthopedics for further eval and management  · Hand cramps     729.82/R25.2  check labs as ordered.     Problems Reconciled  Plan  · Orders  o Physical, Primary Care Panel (CBC, CMP, Lipid, TSH) [facility] ([id number], [id number], 72070, 90360) - 719.41/M25.512, 729.82/R25.2, V77.91/Z13.220 - 05/18/2021  o ACO-39: Current medications updated and reviewed (, 1159F) - - 05/18/2021  o ORTHOPEDIC CONSULTATION (ORTHO) - 719.41/M25.512 - 05/18/2021  o Magnesium, serum (70969) - 729.82/R25.2 - 05/18/2021  · Medications  o albuterol sulfate 2.5 mg /3 mL (0.083 %) inhalation solution for nebulization   SIG: USE ONE vial IN NEBULIZER FOUR TIMES DAILY AS NEEDED   DISP: (180) Milliliter with 1 refills  Refilled on 05/18/2021     o cyclobenzaprine 10 mg oral tablet   SIG: take 1 tablet (10 mg) by oral route 3 times per day for 30 days   DISP: (30) Tablet with 1 refills  Refilled on 05/18/2021     o Medications have been  Reconciled  o Transition of Care or Provider Policy  · Instructions  o Patient was educated/instructed on their diagnosis, treatment and medications prior to discharge from the clinic today.  · Disposition  o Call or Return if symptoms worsen or persist.  o labs done in clinic  o Care Transition  o ARETHA Sent            Electronically Signed by: Micheal Ochoa MD -Author on May 18, 2021 09:51:48 AM

## 2021-06-18 ENCOUNTER — TRANSCRIBE ORDERS (OUTPATIENT)
Dept: ADMINISTRATIVE | Facility: HOSPITAL | Age: 26
End: 2021-06-18

## 2021-06-18 DIAGNOSIS — M25.512 LEFT SHOULDER PAIN, UNSPECIFIED CHRONICITY: Primary | ICD-10-CM

## 2021-06-22 ENCOUNTER — OFFICE VISIT (OUTPATIENT)
Dept: ORTHOPEDIC SURGERY | Facility: CLINIC | Age: 26
End: 2021-06-22

## 2021-06-22 VITALS — WEIGHT: 115 LBS | BODY MASS INDEX: 21.71 KG/M2 | HEIGHT: 61 IN

## 2021-06-22 DIAGNOSIS — M25.551 RIGHT HIP PAIN: ICD-10-CM

## 2021-06-22 DIAGNOSIS — M70.72 BURSITIS OF LEFT HIP, UNSPECIFIED BURSA: Primary | ICD-10-CM

## 2021-06-22 DIAGNOSIS — M25.512 LEFT SHOULDER PAIN, UNSPECIFIED CHRONICITY: ICD-10-CM

## 2021-06-22 PROCEDURE — 99203 OFFICE O/P NEW LOW 30 MIN: CPT | Performed by: ORTHOPAEDIC SURGERY

## 2021-06-22 RX ORDER — MEDROXYPROGESTERONE ACETATE 150 MG/ML
1 INJECTION, SUSPENSION INTRAMUSCULAR
COMMUNITY
End: 2022-02-12

## 2021-06-22 RX ORDER — ALBUTEROL SULFATE 2.5 MG/3ML
SOLUTION RESPIRATORY (INHALATION)
COMMUNITY
Start: 2021-05-19 | End: 2021-06-24 | Stop reason: SDUPTHER

## 2021-06-22 RX ORDER — BUDESONIDE AND FORMOTEROL FUMARATE DIHYDRATE 80; 4.5 UG/1; UG/1
2 AEROSOL RESPIRATORY (INHALATION)
COMMUNITY
End: 2022-06-06

## 2021-06-22 RX ORDER — TOPIRAMATE 50 MG/1
50 TABLET, FILM COATED ORAL
COMMUNITY
End: 2022-06-06 | Stop reason: SDUPTHER

## 2021-06-22 RX ORDER — RIZATRIPTAN BENZOATE 10 MG/1
TABLET ORAL
COMMUNITY

## 2021-06-22 RX ORDER — DICLOFENAC SODIUM 75 MG/1
75 TABLET, DELAYED RELEASE ORAL 2 TIMES DAILY
Qty: 60 TABLET | Refills: 1 | Status: SHIPPED | OUTPATIENT
Start: 2021-06-22 | End: 2021-10-25

## 2021-06-22 RX ORDER — PSEUDOEPHED/ACETAMINOPH/DIPHEN 30MG-500MG
TABLET ORAL
COMMUNITY
Start: 2021-03-22 | End: 2021-10-18

## 2021-06-22 RX ORDER — MONTELUKAST SODIUM 10 MG/1
10 TABLET ORAL
COMMUNITY

## 2021-06-22 RX ORDER — PANTOPRAZOLE SODIUM 20 MG/1
20 TABLET, DELAYED RELEASE ORAL DAILY
COMMUNITY
Start: 2021-04-08 | End: 2022-01-26

## 2021-06-22 RX ORDER — IBUPROFEN 600 MG/1
600 TABLET ORAL
COMMUNITY
End: 2022-01-31

## 2021-06-22 NOTE — PROGRESS NOTES
"Chief Complaint  Initial Evaluation of the Left Shoulder, Initial Evaluation of the Left Wrist, Initial Evaluation of the Left Hand, Initial Evaluation of the Right Hand, and Initial Evaluation of the Right Hip     Subjective      Sally Manuel presents to [facility] for left arm, right hand and right hip. She has been having left shoulder pain for the last several months with no known injury or trauma. She received an injection in this shoulder in the past that has provided her with some relief. She also noticed numbness and hand cramping in bilateral hands after trying to braid hair. She also complains of some lateral sided hip pain. She states her hip feels tight.     No Known Allergies     Social History     Socioeconomic History   • Marital status: Single     Spouse name: Not on file   • Number of children: Not on file   • Years of education: Not on file   • Highest education level: Not on file   Tobacco Use   • Smoking status: Current Every Day Smoker     Packs/day: 2.00     Types: Cigars   Substance and Sexual Activity   • Alcohol use: Yes     Comment: CURRENT SOME DAY , OCCASIONALLY         Review of Systems     Objective   Vital Signs:   Ht 154.9 cm (61\")   Wt 52.2 kg (115 lb)   BMI 21.73 kg/m²       Physical Exam  Constitutional:       Appearance: Normal appearance. He is well-developed and normal weight.   HENT:      Head: Normocephalic.      Right Ear: Hearing and external ear normal.      Left Ear: Hearing and external ear normal.      Nose: Nose normal.   Eyes:      Conjunctiva/sclera: Conjunctivae normal.   Cardiovascular:      Rate and Rhythm: Normal rate.   Pulmonary:      Effort: Pulmonary effort is normal.      Breath sounds: No wheezing or rales.   Abdominal:      Palpations: Abdomen is soft.      Tenderness: There is no abdominal tenderness.   Musculoskeletal:      Cervical back: Normal range of motion.   Skin:     Findings: No rash.   Neurological:      Mental " Status: He is alert and oriented to person, place, and time.   Psychiatric:         Mood and Affect: Mood and affect normal.         Judgment: Judgment normal.       Ortho Exam      LEFT SHOULDER: Good tone of deltoid, triceps, wrist extensors and wrist flexors. Sensation grossly intact. Neurovascular intact. Positive impingement signs. No swelling, skin discoloration or atrophy. Radial pulse 2+, ulnar pulse 2+. Full shoulder range of motion.     BILATERAL HANDS: Neurovascular intact. Sensation grossly intact. No swelling, atrophy, and skin discoloration. Skin intact. Full ROM. Patient able to wiggle fingers and make a fist. Full wrist extension, full wrist flexion, full , full thumb opposition, full PIP flexors, full DIP flexors, full PIP extensors, full finger adduction, full finger abduction. Radial pulse 2+, ulnar pulse 2+.      RIGHT HIP: Sensation grossly intact. Neurovascular intact. Good tone of hip flexors, hip extensors, hip adductor, hip abductors. Good strength to hamstrings, quadriceps, dorsiflexors and plantar flexors. Full passive hip range of motion. Full weight bearing. Tender greater trochanter       Procedures      Imaging Results (Most Recent)     None           Result Review :           Assessment and Plan     DX: Left hip bursitis  Bilateral hand pain  Left shoulder pain     Discussed treatment plans and diagnosis with the patient. Patient prescribed an anti-inflammatory today and was given a prescription for physical therapy.     Call or return if worsening symptoms.    Follow Up     PRN.       Patient was given instructions and counseling regarding her condition or for health maintenance advice. Please see specific information pulled into the AVS if appropriate.     Scribed for Jasmina Crane MD by Debra Mitchell.  06/22/21   11:54 EDT    I have personally performed the services described in this document as scribed by the above individual and it is both accurate and complete.  Jasmina  TCAHO Crane MD 06/22/21  11:54 EDT

## 2021-06-24 RX ORDER — ALBUTEROL SULFATE 2.5 MG/3ML
2.5 SOLUTION RESPIRATORY (INHALATION) EVERY 4 HOURS PRN
Qty: 180 ML | Refills: 2 | Status: SHIPPED | OUTPATIENT
Start: 2021-06-24 | End: 2021-11-08

## 2021-07-12 ENCOUNTER — TELEPHONE (OUTPATIENT)
Dept: GASTROENTEROLOGY | Facility: CLINIC | Age: 26
End: 2021-07-12

## 2021-07-13 ENCOUNTER — TELEPHONE (OUTPATIENT)
Dept: GASTROENTEROLOGY | Facility: CLINIC | Age: 26
End: 2021-07-13

## 2021-07-14 ENCOUNTER — TELEPHONE (OUTPATIENT)
Dept: GASTROENTEROLOGY | Facility: CLINIC | Age: 26
End: 2021-07-14

## 2021-07-15 VITALS
SYSTOLIC BLOOD PRESSURE: 122 MMHG | WEIGHT: 120 LBS | OXYGEN SATURATION: 98 % | HEIGHT: 61 IN | DIASTOLIC BLOOD PRESSURE: 76 MMHG | TEMPERATURE: 98.1 F | HEART RATE: 82 BPM | BODY MASS INDEX: 22.66 KG/M2

## 2021-07-15 VITALS
HEART RATE: 89 BPM | DIASTOLIC BLOOD PRESSURE: 85 MMHG | WEIGHT: 117 LBS | HEIGHT: 61 IN | BODY MASS INDEX: 22.09 KG/M2 | OXYGEN SATURATION: 98 % | SYSTOLIC BLOOD PRESSURE: 121 MMHG

## 2021-07-24 RX ORDER — CYCLOBENZAPRINE HCL 10 MG
10 TABLET ORAL 3 TIMES DAILY PRN
Qty: 90 TABLET | Refills: 1 | Status: SHIPPED | OUTPATIENT
Start: 2021-07-24 | End: 2021-10-03 | Stop reason: SDUPTHER

## 2021-10-04 RX ORDER — CYCLOBENZAPRINE HCL 10 MG
10 TABLET ORAL 3 TIMES DAILY PRN
Qty: 90 TABLET | Refills: 1 | Status: SHIPPED | OUTPATIENT
Start: 2021-10-04 | End: 2022-05-03

## 2021-10-18 ENCOUNTER — HOSPITAL ENCOUNTER (EMERGENCY)
Facility: HOSPITAL | Age: 26
Discharge: HOME | End: 2021-10-18
Attending: EMERGENCY MEDICINE | Admitting: EMERGENCY MEDICINE

## 2021-10-18 VITALS
TEMPERATURE: 98.3 F | OXYGEN SATURATION: 95 % | BODY MASS INDEX: 22.23 KG/M2 | HEART RATE: 88 BPM | DIASTOLIC BLOOD PRESSURE: 68 MMHG | SYSTOLIC BLOOD PRESSURE: 115 MMHG | WEIGHT: 117.73 LBS | HEIGHT: 61 IN | RESPIRATION RATE: 13 BRPM

## 2021-10-18 DIAGNOSIS — T51.91XA ALCOHOL POISONING, ACCIDENTAL OR UNINTENTIONAL, INITIAL ENCOUNTER: Primary | ICD-10-CM

## 2021-10-18 LAB
ALBUMIN SERPL-MCNC: 5 G/DL (ref 3.5–5.2)
ALBUMIN/GLOB SERPL: 1.3 G/DL
ALP SERPL-CCNC: 95 U/L (ref 39–117)
ALT SERPL W P-5'-P-CCNC: 29 U/L (ref 1–33)
ANION GAP SERPL CALCULATED.3IONS-SCNC: 13.6 MMOL/L (ref 5–15)
AST SERPL-CCNC: 36 U/L (ref 1–32)
BACTERIA UR QL AUTO: ABNORMAL /HPF
BASOPHILS # BLD AUTO: 0.04 10*3/MM3 (ref 0–0.2)
BASOPHILS NFR BLD AUTO: 0.3 % (ref 0–1.5)
BILIRUB SERPL-MCNC: 0.7 MG/DL (ref 0–1.2)
BILIRUB UR QL STRIP: NEGATIVE
BUN SERPL-MCNC: 6 MG/DL (ref 6–20)
BUN/CREAT SERPL: 7.4 (ref 7–25)
CALCIUM SPEC-SCNC: 9.7 MG/DL (ref 8.6–10.5)
CHLORIDE SERPL-SCNC: 102 MMOL/L (ref 98–107)
CLARITY UR: ABNORMAL
CO2 SERPL-SCNC: 25.4 MMOL/L (ref 22–29)
COLOR UR: YELLOW
CREAT SERPL-MCNC: 0.81 MG/DL (ref 0.57–1)
DEPRECATED RDW RBC AUTO: 40.9 FL (ref 37–54)
EOSINOPHIL # BLD AUTO: 0.01 10*3/MM3 (ref 0–0.4)
EOSINOPHIL NFR BLD AUTO: 0.1 % (ref 0.3–6.2)
ERYTHROCYTE [DISTWIDTH] IN BLOOD BY AUTOMATED COUNT: 12.3 % (ref 12.3–15.4)
ETHANOL BLD-MCNC: <10 MG/DL (ref 0–10)
ETHANOL UR QL: <0.01 %
GFR SERPL CREATININE-BSD FRML MDRD: 103 ML/MIN/1.73
GLOBULIN UR ELPH-MCNC: 3.9 GM/DL
GLUCOSE SERPL-MCNC: 105 MG/DL (ref 65–99)
GLUCOSE UR STRIP-MCNC: NEGATIVE MG/DL
HCG SERPL QL: NEGATIVE
HCT VFR BLD AUTO: 44.3 % (ref 34–46.6)
HGB BLD-MCNC: 15.4 G/DL (ref 12–15.9)
HGB UR QL STRIP.AUTO: NEGATIVE
HOLD SPECIMEN: NORMAL
HYALINE CASTS UR QL AUTO: ABNORMAL /LPF
IMM GRANULOCYTES # BLD AUTO: 0.04 10*3/MM3 (ref 0–0.05)
IMM GRANULOCYTES NFR BLD AUTO: 0.3 % (ref 0–0.5)
KETONES UR QL STRIP: ABNORMAL
LEUKOCYTE ESTERASE UR QL STRIP.AUTO: NEGATIVE
LIPASE SERPL-CCNC: 28 U/L (ref 13–60)
LYMPHOCYTES # BLD AUTO: 1.36 10*3/MM3 (ref 0.7–3.1)
LYMPHOCYTES NFR BLD AUTO: 10.1 % (ref 19.6–45.3)
MCH RBC QN AUTO: 31.8 PG (ref 26.6–33)
MCHC RBC AUTO-ENTMCNC: 34.8 G/DL (ref 31.5–35.7)
MCV RBC AUTO: 91.3 FL (ref 79–97)
MONOCYTES # BLD AUTO: 0.45 10*3/MM3 (ref 0.1–0.9)
MONOCYTES NFR BLD AUTO: 3.3 % (ref 5–12)
MUCOUS THREADS URNS QL MICRO: ABNORMAL /HPF
NEUTROPHILS NFR BLD AUTO: 11.6 10*3/MM3 (ref 1.7–7)
NEUTROPHILS NFR BLD AUTO: 85.9 % (ref 42.7–76)
NITRITE UR QL STRIP: NEGATIVE
NRBC BLD AUTO-RTO: 0 /100 WBC (ref 0–0.2)
PH UR STRIP.AUTO: 8 [PH] (ref 5–8)
PLATELET # BLD AUTO: 353 10*3/MM3 (ref 140–450)
PMV BLD AUTO: 9.8 FL (ref 6–12)
POTASSIUM SERPL-SCNC: 3.6 MMOL/L (ref 3.5–5.2)
PROT SERPL-MCNC: 8.9 G/DL (ref 6–8.5)
PROT UR QL STRIP: ABNORMAL
RBC # BLD AUTO: 4.85 10*6/MM3 (ref 3.77–5.28)
RBC # UR: ABNORMAL /HPF
REF LAB TEST METHOD: ABNORMAL
SODIUM SERPL-SCNC: 141 MMOL/L (ref 136–145)
SP GR UR STRIP: 1.02 (ref 1–1.03)
SQUAMOUS #/AREA URNS HPF: ABNORMAL /HPF
UROBILINOGEN UR QL STRIP: ABNORMAL
WBC # BLD AUTO: 13.5 10*3/MM3 (ref 3.4–10.8)
WBC UR QL AUTO: ABNORMAL /HPF
WHOLE BLOOD HOLD SPECIMEN: NORMAL
WHOLE BLOOD HOLD SPECIMEN: NORMAL

## 2021-10-18 PROCEDURE — 96375 TX/PRO/DX INJ NEW DRUG ADDON: CPT

## 2021-10-18 PROCEDURE — 96374 THER/PROPH/DIAG INJ IV PUSH: CPT

## 2021-10-18 PROCEDURE — 25010000002 ONDANSETRON PER 1 MG: Performed by: EMERGENCY MEDICINE

## 2021-10-18 PROCEDURE — 84703 CHORIONIC GONADOTROPIN ASSAY: CPT | Performed by: EMERGENCY MEDICINE

## 2021-10-18 PROCEDURE — 82077 ASSAY SPEC XCP UR&BREATH IA: CPT | Performed by: EMERGENCY MEDICINE

## 2021-10-18 PROCEDURE — 99283 EMERGENCY DEPT VISIT LOW MDM: CPT

## 2021-10-18 PROCEDURE — 81001 URINALYSIS AUTO W/SCOPE: CPT | Performed by: EMERGENCY MEDICINE

## 2021-10-18 PROCEDURE — 83690 ASSAY OF LIPASE: CPT | Performed by: EMERGENCY MEDICINE

## 2021-10-18 PROCEDURE — 85025 COMPLETE CBC W/AUTO DIFF WBC: CPT | Performed by: EMERGENCY MEDICINE

## 2021-10-18 PROCEDURE — 80053 COMPREHEN METABOLIC PANEL: CPT | Performed by: EMERGENCY MEDICINE

## 2021-10-18 RX ORDER — ONDANSETRON 2 MG/ML
4 INJECTION INTRAMUSCULAR; INTRAVENOUS ONCE
Status: COMPLETED | OUTPATIENT
Start: 2021-10-18 | End: 2021-10-18

## 2021-10-18 RX ORDER — PANTOPRAZOLE SODIUM 40 MG/10ML
40 INJECTION, POWDER, LYOPHILIZED, FOR SOLUTION INTRAVENOUS ONCE
Status: COMPLETED | OUTPATIENT
Start: 2021-10-18 | End: 2021-10-18

## 2021-10-18 RX ORDER — DROPERIDOL 2.5 MG/ML
1.25 INJECTION, SOLUTION INTRAMUSCULAR; INTRAVENOUS ONCE
Status: DISCONTINUED | OUTPATIENT
Start: 2021-10-18 | End: 2021-10-18 | Stop reason: HOSPADM

## 2021-10-18 RX ORDER — ONDANSETRON 4 MG/1
4 TABLET, ORALLY DISINTEGRATING ORAL EVERY 8 HOURS PRN
Qty: 9 TABLET | Refills: 0 | Status: SHIPPED | OUTPATIENT
Start: 2021-10-18

## 2021-10-18 RX ORDER — SODIUM CHLORIDE 0.9 % (FLUSH) 0.9 %
10 SYRINGE (ML) INJECTION AS NEEDED
Status: DISCONTINUED | OUTPATIENT
Start: 2021-10-18 | End: 2021-10-18 | Stop reason: HOSPADM

## 2021-10-18 RX ORDER — ACETAMINOPHEN 500 MG
1000 TABLET ORAL EVERY 6 HOURS PRN
Qty: 30 TABLET | Refills: 0 | Status: SHIPPED | OUTPATIENT
Start: 2021-10-18 | End: 2022-02-12

## 2021-10-18 RX ADMIN — SODIUM CHLORIDE 1000 ML: 9 INJECTION, SOLUTION INTRAVENOUS at 16:11

## 2021-10-18 RX ADMIN — PANTOPRAZOLE SODIUM 40 MG: 40 INJECTION, POWDER, FOR SOLUTION INTRAVENOUS at 16:10

## 2021-10-18 RX ADMIN — ONDANSETRON 4 MG: 2 INJECTION INTRAMUSCULAR; INTRAVENOUS at 16:10

## 2021-10-18 NOTE — ED PROVIDER NOTES
Subjective   Sally Manuel is a 26 y.o. female that presents to the ED via EMS accompanied by family for NAUSEA and EMESIS. This started this morning and has improved after receiving Zofran by EMS en route to ED. It is described as moderate in severity at its maximum. Pt notes that she was drinking ETOH prior to onset of symptoms.     Pt also c/o epigastric abdominal pain, dysuria, and urinary frequency. She does report blood in the emesis. She denies pregnancy.       History provided by:  Patient      Review of Systems   Constitutional: Negative for chills, diaphoresis and fever.   HENT: Negative for ear discharge and nosebleeds.    Eyes: Negative for photophobia.   Respiratory: Negative for shortness of breath.    Cardiovascular: Negative for chest pain.   Gastrointestinal: Positive for abdominal pain, nausea and vomiting. Negative for diarrhea.   Genitourinary: Positive for dysuria and frequency.   Musculoskeletal: Negative for back pain and neck pain.   Skin: Negative for rash.   Neurological: Negative for headaches.   All other systems reviewed and are negative.      Past Medical History:   Diagnosis Date   • Allergies    • Anxiety    • Arthritis    • Asthma    • Deafness    • Depression    • Epilepsy (HCC)    • GERD (gastroesophageal reflux disease)    • Head injury    • History of psychiatric care    • Migraines    • Ringing in ears    • Seizures (HCC)    • Shortness of breath    • STD exposure    • VSD (ventricular septal defect)        No Known Allergies    Past Surgical History:   Procedure Laterality Date   • BACK SURGERY  2015   • CARDIAC SURGERY     • ENDOSCOPY  2017   • KNEE SURGERY  01/2016   • OTHER SURGICAL HISTORY      METAL IMPLANTS   • TRACHEOSTOMY     • VSD REPAIR         Family History   Problem Relation Age of Onset   • Stroke Mother    • Diabetes Mother    • Heart disease Father    • Diabetes Father    • Breast cancer Other    • Diabetes type II Other        Social History     Socioeconomic  History   • Marital status: Single   Tobacco Use   • Smoking status: Current Every Day Smoker     Packs/day: 2.00     Types: Cigars   Substance and Sexual Activity   • Alcohol use: Yes     Comment: CURRENT SOME DAY , OCCASIONALLY          Objective   Physical Exam  Vitals and nursing note reviewed.   Constitutional:       General: She is not in acute distress.     Appearance: Normal appearance.   HENT:      Head: Normocephalic and atraumatic.      Nose: Nose normal.   Eyes:      General: No scleral icterus.  Cardiovascular:      Rate and Rhythm: Normal rate.   Pulmonary:      Effort: Pulmonary effort is normal. No respiratory distress.   Abdominal:      General: There is no distension.      Palpations: Abdomen is soft.      Tenderness: There is abdominal tenderness (mild) in the epigastric area.   Musculoskeletal:         General: Normal range of motion.      Cervical back: Neck supple.      Right lower leg: No edema.      Left lower leg: No edema.   Skin:     General: Skin is warm and dry.   Neurological:      General: No focal deficit present.      Mental Status: She is alert and oriented to person, place, and time.      Sensory: No sensory deficit.      Motor: No weakness.         Procedures         ED Course                                            MDM  Number of Diagnoses or Management Options     Amount and/or Complexity of Data Reviewed  Clinical lab tests: reviewed  Obtain history from someone other than the patient: yes (Friend.)  Review and summarize past medical records: yes (No pertinent recent medical visits. )      Differential diagnosis includes alcohol poisoning, urinary tract infection, drug overdose, and gastroenteritis among others.    Cardiac monitor interpretation: Normal sinus rhythm.  Rate 86.    Pulse oximetry interpretation: 100% SPO2 on room air at rest.  Normal.    The patient's work-up indicates a normal urinalysis, normal lipase, negative ethanol level, CBC shows mild leukocytosis, hCG  is negative, metabolic panel is essentially normal.        On reevaluation at 5:55 PM the patient was resting comfortably smiling and watching TV.  She had no further abdominal tenderness but does report some lingering nausea.  I suspect she is pretty well hung over after drinking excessive amounts of peach schnapps and vodka last night.  Final diagnoses:   Alcohol poisoning, accidental or unintentional, initial encounter       Documentation assistance provided by rogers Chavez.  Information recorded by the scribe was done at my direction and has been verified and validated by me.     Augusta Chavez  10/18/21 [id number]       Jhonny Slaughter DO  10/18/21 [id number]

## 2021-10-25 ENCOUNTER — OFFICE VISIT (OUTPATIENT)
Dept: INTERNAL MEDICINE | Facility: CLINIC | Age: 26
End: 2021-10-25

## 2021-10-25 VITALS
BODY MASS INDEX: 23.3 KG/M2 | HEART RATE: 81 BPM | TEMPERATURE: 98.5 F | WEIGHT: 123.4 LBS | OXYGEN SATURATION: 99 % | HEIGHT: 61 IN | DIASTOLIC BLOOD PRESSURE: 54 MMHG | SYSTOLIC BLOOD PRESSURE: 89 MMHG

## 2021-10-25 DIAGNOSIS — F33.9 EPISODE OF RECURRENT MAJOR DEPRESSIVE DISORDER, UNSPECIFIED DEPRESSION EPISODE SEVERITY (HCC): Primary | ICD-10-CM

## 2021-10-25 PROBLEM — H91.90 DEAFNESS: Status: ACTIVE | Noted: 2021-10-25

## 2021-10-25 PROBLEM — Q23.3 CONGENITAL MITRAL INSUFFICIENCY: Status: ACTIVE | Noted: 2019-05-02

## 2021-10-25 PROCEDURE — 99213 OFFICE O/P EST LOW 20 MIN: CPT | Performed by: INTERNAL MEDICINE

## 2021-10-25 RX ORDER — SERTRALINE HYDROCHLORIDE 25 MG/1
25 TABLET, FILM COATED ORAL DAILY
Qty: 60 TABLET | Refills: 1 | Status: SHIPPED | OUTPATIENT
Start: 2021-10-25 | End: 2021-11-09 | Stop reason: SDUPTHER
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
## 2021-11-08 RX ORDER — ALBUTEROL SULFATE 2.5 MG/3ML
SOLUTION RESPIRATORY (INHALATION)
Qty: 180 ML | Refills: 2 | Status: SHIPPED | OUTPATIENT
Start: 2021-11-08 | End: 2022-01-26

## 2021-11-09 ENCOUNTER — OFFICE VISIT (OUTPATIENT)
Dept: INTERNAL MEDICINE | Facility: CLINIC | Age: 26
End: 2021-11-09

## 2021-11-09 VITALS
BODY MASS INDEX: 22.94 KG/M2 | OXYGEN SATURATION: 99 % | HEART RATE: 78 BPM | DIASTOLIC BLOOD PRESSURE: 68 MMHG | HEIGHT: 61 IN | TEMPERATURE: 98 F | WEIGHT: 121.5 LBS | SYSTOLIC BLOOD PRESSURE: 102 MMHG

## 2021-11-09 DIAGNOSIS — F33.9 EPISODE OF RECURRENT MAJOR DEPRESSIVE DISORDER, UNSPECIFIED DEPRESSION EPISODE SEVERITY (HCC): Primary | ICD-10-CM

## 2021-11-09 PROBLEM — K21.9 ESOPHAGEAL REFLUX: Status: ACTIVE | Noted: 2021-11-09

## 2021-11-09 PROBLEM — G40.909 EPILEPSY (HCC): Status: ACTIVE | Noted: 2021-11-09

## 2021-11-09 PROBLEM — Z87.74 PERSONAL HISTORY OF (CORRECTED) CONGENITAL MALFORMATIONS OF HEART AND CIRCULATORY SYSTEM: Status: ACTIVE | Noted: 2019-05-16

## 2021-11-09 PROCEDURE — 99213 OFFICE O/P EST LOW 20 MIN: CPT | Performed by: INTERNAL MEDICINE

## 2021-11-09 RX ORDER — SERTRALINE HYDROCHLORIDE 25 MG/1
25 TABLET, FILM COATED ORAL DAILY
Qty: 90 TABLET | Refills: 1 | Status: SHIPPED | OUTPATIENT
Start: 2021-11-09 | End: 2022-01-25

## 2021-11-09 NOTE — PROGRESS NOTES
"Chief Complaint  Follow-up (meds)    Subjective          Sally Manuel presents to [facility]  History of Present Illness  Depression- pt reports some help with sertraline. Pt reports she has some compliance issues when she gets off her routine. Pt reports he mood is \"better than before.\" pt reports working a lot of days recently. Pt notices when she missed her medication. Pt reports sleeping better, and is tired more often because she is working more often. Pt reports not eating a lot because she has been busy.    Objective   Vital Signs:   /68 (BP Location: Right arm, Patient Position: Sitting, Cuff Size: Adult)   Pulse 78   Temp 98 °F (36.7 °C) (Temporal)   Ht 154.9 cm (61\")   Wt 55.1 kg (121 lb 8 oz)   SpO2 99%   BMI 22.96 kg/m²     Wt Readings from Last 3 Encounters:   11/09/21 55.1 kg (121 lb 8 oz)   10/25/21 56 kg (123 lb 6.4 oz)   10/18/21 53.4 kg (117 lb 11.6 oz)     BP Readings from Last 3 Encounters:   11/09/21 102/68   10/25/21 (!) 89/54   10/18/21 115/68     Physical Exam   Appearance: No acute distress, well-nourished  Head: normocephalic, atraumatic  Eyes: extraocular movements intact, no scleral icterus, no conjunctival injection  Ears, Nose, and Throat: external ears normal, nares patent, moist mucous membranes  Cardiovascular: regular rate and rhythm. no murmurs, rales, or rhonchi. no edema  Respiratory: breathing comfortably, symmetric chest rise, clear to auscultation bilaterally. No wheezes, rales, or rhonchi.  Neuro: alert and oriented to time, place, and person. Normal gait  Psych: normal mood and affect     Result Review :   The following data was reviewed by: Micheal Ochoa Jr, MD on 11/09/2021:  Common labs    Common Labsle 5/18/21 10/18/21 10/18/21     1602 1602   Glucose 80  105 (A)   BUN 8  6   Creatinine 0.85  0.81   eGFR African Am   103   Sodium 135  141   Potassium 4.6  3.6   Chloride 100  102   Calcium 9.4  9.7 "   Albumin 4.4  5.00   Total Bilirubin 0.32  0.7   Alkaline Phosphatase 76  95   AST (SGOT) 27  36 (A)   ALT (SGPT) 36  29   WBC 16.92 (A) 13.50 (A)    Hemoglobin 14.2 15.4    Hematocrit 42.7 44.3    Platelets 421 (A) 353    Total Cholesterol 185     Triglycerides 52     HDL Cholesterol 73 (A)     LDL Cholesterol  102 (A)     (A) Abnormal value       Comments are available for some flowsheets but are not being displayed.             Lab Results   Component Value Date    COVID19 NOT DETECTED 10/01/2020        Assessment and Plan    Diagnoses and all orders for this visit:    1. Episode of recurrent major depressive disorder, unspecified depression episode severity (HCC) (Primary)  -     sertraline (Zoloft) 25 MG tablet; Take 1 tablet by mouth Daily.  Dispense: 90 tablet; Refill: 1        Follow Up   Return in about 5 weeks (around 12/14/2021) for Recheck.  Patient was given instructions and counseling regarding her condition or for health maintenance advice. Please see specific information pulled into the AVS if appropriate.

## 2021-12-08 DIAGNOSIS — M25.512 LEFT SHOULDER PAIN, UNSPECIFIED CHRONICITY: ICD-10-CM

## 2021-12-08 DIAGNOSIS — M25.551 RIGHT HIP PAIN: ICD-10-CM

## 2021-12-08 DIAGNOSIS — M70.72 BURSITIS OF LEFT HIP, UNSPECIFIED BURSA: ICD-10-CM

## 2021-12-08 RX ORDER — DICLOFENAC SODIUM 75 MG/1
TABLET, DELAYED RELEASE ORAL
Qty: 60 TABLET | Refills: 1 | OUTPATIENT
Start: 2021-12-08 | End: 2022-02-12

## 2022-01-25 DIAGNOSIS — F33.9 EPISODE OF RECURRENT MAJOR DEPRESSIVE DISORDER, UNSPECIFIED DEPRESSION EPISODE SEVERITY: ICD-10-CM

## 2022-01-25 RX ORDER — SERTRALINE HYDROCHLORIDE 25 MG/1
TABLET, FILM COATED ORAL
Qty: 90 TABLET | Refills: 1 | Status: SHIPPED | OUTPATIENT
Start: 2022-01-25

## 2022-01-26 RX ORDER — ALBUTEROL SULFATE 2.5 MG/3ML
SOLUTION RESPIRATORY (INHALATION)
Qty: 180 ML | Refills: 2 | Status: SHIPPED | OUTPATIENT
Start: 2022-01-26 | End: 2022-03-30

## 2022-01-26 RX ORDER — PANTOPRAZOLE SODIUM 20 MG/1
TABLET, DELAYED RELEASE ORAL
Qty: 30 TABLET | Refills: 3 | Status: SHIPPED | OUTPATIENT
Start: 2022-01-26 | End: 2022-08-15

## 2022-01-30 ENCOUNTER — HOSPITAL ENCOUNTER (EMERGENCY)
Facility: HOSPITAL | Age: 27
Discharge: HOME | End: 2022-01-31
Attending: EMERGENCY MEDICINE | Admitting: EMERGENCY MEDICINE

## 2022-01-30 VITALS
WEIGHT: 117.28 LBS | SYSTOLIC BLOOD PRESSURE: 91 MMHG | TEMPERATURE: 98.1 F | HEIGHT: 61 IN | DIASTOLIC BLOOD PRESSURE: 56 MMHG | BODY MASS INDEX: 22.14 KG/M2 | OXYGEN SATURATION: 100 % | RESPIRATION RATE: 14 BRPM | HEART RATE: 79 BPM

## 2022-01-30 DIAGNOSIS — N94.6 DYSMENORRHEA: Primary | ICD-10-CM

## 2022-01-30 LAB
ALBUMIN SERPL-MCNC: 4 G/DL (ref 3.5–5.2)
ALBUMIN/GLOB SERPL: 1.4 G/DL
ALP SERPL-CCNC: 85 U/L (ref 39–117)
ALT SERPL W P-5'-P-CCNC: 13 U/L (ref 1–33)
ANION GAP SERPL CALCULATED.3IONS-SCNC: 13 MMOL/L (ref 5–15)
AST SERPL-CCNC: 18 U/L (ref 1–32)
BACTERIA UR QL AUTO: ABNORMAL /HPF
BASOPHILS # BLD AUTO: 0.05 10*3/MM3 (ref 0–0.2)
BASOPHILS NFR BLD AUTO: 0.5 % (ref 0–1.5)
BILIRUB SERPL-MCNC: 0.4 MG/DL (ref 0–1.2)
BILIRUB UR QL STRIP: NEGATIVE
BUN SERPL-MCNC: 8 MG/DL (ref 6–20)
BUN/CREAT SERPL: 11 (ref 7–25)
CALCIUM SPEC-SCNC: 8.7 MG/DL (ref 8.6–10.5)
CHLORIDE SERPL-SCNC: 103 MMOL/L (ref 98–107)
CLARITY UR: CLEAR
CO2 SERPL-SCNC: 22 MMOL/L (ref 22–29)
COLOR UR: YELLOW
CREAT SERPL-MCNC: 0.73 MG/DL (ref 0.57–1)
DEPRECATED RDW RBC AUTO: 45.1 FL (ref 37–54)
EOSINOPHIL # BLD AUTO: 0.11 10*3/MM3 (ref 0–0.4)
EOSINOPHIL NFR BLD AUTO: 1.2 % (ref 0.3–6.2)
ERYTHROCYTE [DISTWIDTH] IN BLOOD BY AUTOMATED COUNT: 12.7 % (ref 12.3–15.4)
GFR SERPL CREATININE-BSD FRML MDRD: 116 ML/MIN/1.73
GLOBULIN UR ELPH-MCNC: 2.8 GM/DL
GLUCOSE SERPL-MCNC: 72 MG/DL (ref 65–99)
GLUCOSE UR STRIP-MCNC: NEGATIVE MG/DL
HCG INTACT+B SERPL-ACNC: <0.5 MIU/ML
HCT VFR BLD AUTO: 38.4 % (ref 34–46.6)
HGB BLD-MCNC: 12.7 G/DL (ref 12–15.9)
HGB UR QL STRIP.AUTO: ABNORMAL
HOLD SPECIMEN: NORMAL
HOLD SPECIMEN: NORMAL
HYALINE CASTS UR QL AUTO: ABNORMAL /LPF
IMM GRANULOCYTES # BLD AUTO: 0.03 10*3/MM3 (ref 0–0.05)
IMM GRANULOCYTES NFR BLD AUTO: 0.3 % (ref 0–0.5)
KETONES UR QL STRIP: NEGATIVE
LEUKOCYTE ESTERASE UR QL STRIP.AUTO: NEGATIVE
LIPASE SERPL-CCNC: 29 U/L (ref 13–60)
LYMPHOCYTES # BLD AUTO: 2.97 10*3/MM3 (ref 0.7–3.1)
LYMPHOCYTES NFR BLD AUTO: 32.1 % (ref 19.6–45.3)
MCH RBC QN AUTO: 31.6 PG (ref 26.6–33)
MCHC RBC AUTO-ENTMCNC: 33.1 G/DL (ref 31.5–35.7)
MCV RBC AUTO: 95.5 FL (ref 79–97)
MONOCYTES # BLD AUTO: 0.65 10*3/MM3 (ref 0.1–0.9)
MONOCYTES NFR BLD AUTO: 7 % (ref 5–12)
NEUTROPHILS NFR BLD AUTO: 5.44 10*3/MM3 (ref 1.7–7)
NEUTROPHILS NFR BLD AUTO: 58.9 % (ref 42.7–76)
NITRITE UR QL STRIP: NEGATIVE
NRBC BLD AUTO-RTO: 0 /100 WBC (ref 0–0.2)
PH UR STRIP.AUTO: 7.5 [PH] (ref 5–8)
PLATELET # BLD AUTO: 286 10*3/MM3 (ref 140–450)
PMV BLD AUTO: 10.3 FL (ref 6–12)
POTASSIUM SERPL-SCNC: 3.5 MMOL/L (ref 3.5–5.2)
PROT SERPL-MCNC: 6.8 G/DL (ref 6–8.5)
PROT UR QL STRIP: ABNORMAL
RBC # BLD AUTO: 4.02 10*6/MM3 (ref 3.77–5.28)
RBC # UR STRIP: ABNORMAL /HPF
REF LAB TEST METHOD: ABNORMAL
SODIUM SERPL-SCNC: 138 MMOL/L (ref 136–145)
SP GR UR STRIP: 1.03 (ref 1–1.03)
SQUAMOUS #/AREA URNS HPF: ABNORMAL /HPF
UROBILINOGEN UR QL STRIP: ABNORMAL
WBC # UR STRIP: ABNORMAL /HPF
WBC NRBC COR # BLD: 9.25 10*3/MM3 (ref 3.4–10.8)
WHOLE BLOOD HOLD SPECIMEN: NORMAL
WHOLE BLOOD HOLD SPECIMEN: NORMAL

## 2022-01-30 PROCEDURE — 83690 ASSAY OF LIPASE: CPT

## 2022-01-30 PROCEDURE — 36415 COLL VENOUS BLD VENIPUNCTURE: CPT

## 2022-01-30 PROCEDURE — 84702 CHORIONIC GONADOTROPIN TEST: CPT

## 2022-01-30 PROCEDURE — 81001 URINALYSIS AUTO W/SCOPE: CPT

## 2022-01-30 PROCEDURE — 99283 EMERGENCY DEPT VISIT LOW MDM: CPT

## 2022-01-30 PROCEDURE — 80053 COMPREHEN METABOLIC PANEL: CPT

## 2022-01-30 PROCEDURE — 85025 COMPLETE CBC W/AUTO DIFF WBC: CPT

## 2022-01-30 RX ORDER — IBUPROFEN 400 MG/1
800 TABLET ORAL ONCE
Status: COMPLETED | OUTPATIENT
Start: 2022-01-30 | End: 2022-01-30

## 2022-01-30 RX ORDER — SODIUM CHLORIDE 0.9 % (FLUSH) 0.9 %
10 SYRINGE (ML) INJECTION AS NEEDED
Status: DISCONTINUED | OUTPATIENT
Start: 2022-01-30 | End: 2022-01-31 | Stop reason: HOSPADM

## 2022-01-30 RX ADMIN — IBUPROFEN 800 MG: 400 TABLET, FILM COATED ORAL at 23:21

## 2022-01-31 RX ORDER — NAPROXEN 500 MG/1
500 TABLET ORAL 2 TIMES DAILY PRN
Qty: 10 TABLET | Refills: 0 | Status: SHIPPED | OUTPATIENT
Start: 2022-01-31 | End: 2022-02-05

## 2022-03-16 ENCOUNTER — OFFICE VISIT (OUTPATIENT)
Dept: INTERNAL MEDICINE | Facility: CLINIC | Age: 27
End: 2022-03-16

## 2022-03-16 VITALS
SYSTOLIC BLOOD PRESSURE: 97 MMHG | OXYGEN SATURATION: 98 % | HEIGHT: 61 IN | TEMPERATURE: 97.4 F | HEART RATE: 83 BPM | DIASTOLIC BLOOD PRESSURE: 64 MMHG | BODY MASS INDEX: 20.8 KG/M2 | WEIGHT: 110.19 LBS

## 2022-03-16 DIAGNOSIS — R10.31 RIGHT GROIN PAIN: ICD-10-CM

## 2022-03-16 DIAGNOSIS — Z00.00 ANNUAL PHYSICAL EXAM: Primary | ICD-10-CM

## 2022-03-16 DIAGNOSIS — M54.12 CERVICAL RADICULOPATHY: ICD-10-CM

## 2022-03-16 DIAGNOSIS — Z11.59 NEED FOR HEPATITIS C SCREENING TEST: ICD-10-CM

## 2022-03-16 DIAGNOSIS — M77.8 RIGHT SHOULDER TENDINITIS: ICD-10-CM

## 2022-03-16 PROCEDURE — 99395 PREV VISIT EST AGE 18-39: CPT | Performed by: NURSE PRACTITIONER

## 2022-03-16 PROCEDURE — 3008F BODY MASS INDEX DOCD: CPT | Performed by: NURSE PRACTITIONER

## 2022-03-16 PROCEDURE — 20610 DRAIN/INJ JOINT/BURSA W/O US: CPT | Performed by: NURSE PRACTITIONER

## 2022-03-16 PROCEDURE — 2014F MENTAL STATUS ASSESS: CPT | Performed by: NURSE PRACTITIONER

## 2022-03-16 RX ORDER — TRIAMCINOLONE ACETONIDE 40 MG/ML
80 INJECTION, SUSPENSION INTRA-ARTICULAR; INTRAMUSCULAR
Status: COMPLETED | OUTPATIENT
Start: 2022-03-16 | End: 2022-03-16

## 2022-03-16 RX ADMIN — TRIAMCINOLONE ACETONIDE 80 MG: 40 INJECTION, SUSPENSION INTRA-ARTICULAR; INTRAMUSCULAR at 15:34

## 2022-03-16 NOTE — PROGRESS NOTES
Chief Complaint  Annual Exam, Groin Pain (Says when working at [employer], pulled groin muscle and this still hurts sometimes, limits motions daily), Shoulder Pain (Right shoulder pain, when moving certain way it shoots pain down to shoulder blade), and Covid Vaccine (Wanting to discuss COVID vaccine)    Subjective          Sally Manuel presents to [facility]  Shoulder Injury   The incident occurred at home. The right shoulder is affected. Incident onset: Several weeks ago. The injury mechanism was overhead work. The pain radiates to the back and right neck. Pertinent negatives include no chest pain, muscle weakness, numbness or tingling. The symptoms are aggravated by movement and overhead lifting. She has tried acetaminophen, rest and NSAIDs for the symptoms. The treatment provided mild (States that Dr. Pineda gave her shoulder injection last year and the other shoulder and it helped tremendously.  Requesting another injection) relief.       26-year-old female patient presents to the clinic today for her annual physical exam.    Patient reports that she pulled her right groin several weeks ago.  Patient states that she no longer has pain with every day movements, but pain is sometimes exacerbated by certain movements.  We discussed stretches during visit to help with the groin tightness.    Current Outpatient Medications   Medication Instructions   • albuterol (PROVENTIL) (2.5 MG/3ML) 0.083% nebulizer solution USE ONE vial IN NEBULIZER EVERY 4 HOURS AS NEEDED for wheezing   • budesonide-formoterol (SYMBICORT) 80-4.5 MCG/ACT inhaler 2 puffs, Inhalation   • cyclobenzaprine (FLEXERIL) 10 mg, Oral, 3 Times Daily PRN   • Diclofenac Sodium 4 g, Topical   • montelukast (SINGULAIR) 10 mg, Oral   • ondansetron ODT (ZOFRAN-ODT) 4 mg, Translingual, Every 8 Hours PRN   • pantoprazole (PROTONIX) 20 MG EC tablet TAKE 1 TABLET BY MOUTH ONCE daily   • rizatriptan (MAXALT) 10 MG tablet  "rizatriptan 10 mg oral tablet take 1 tablet (10 mg) by oral route once, may repeat at 2 hour intervals; do not exceed 30 mg in 24 hours for 30 days   Suspended   • sertraline (ZOLOFT) 25 MG tablet TAKE 1 TABLET BY MOUTH EVERY DAY   • topiramate (TOPAMAX) 50 MG tablet Take 50 mg by mouth.       The following portions of the patient's history were reviewed and updated as appropriate: allergies, current medications, past family history, past medical history, past social history, past surgical history, and problem list.    Objective   Vital Signs:   BP 97/64   Pulse 83   Temp 97.4 °F (36.3 °C) (Temporal)   Ht 154.9 cm (61\")   Wt 50 kg (110 lb 3 oz)   SpO2 98%   BMI 20.82 kg/m²     Wt Readings from Last 3 Encounters:   03/16/22 50 kg (110 lb 3 oz)   02/12/22 50.8 kg (112 lb)   01/30/22 53.2 kg (117 lb 4.6 oz)     BP Readings from Last 3 Encounters:   03/16/22 97/64   02/12/22 100/61   01/30/22 91/56     Physical Exam  Vitals and nursing note reviewed.   Constitutional:       General: She is not in acute distress.     Appearance: Normal appearance. She is not ill-appearing.   HENT:      Head: Normocephalic.   Eyes:      Extraocular Movements: Extraocular movements intact.      Conjunctiva/sclera: Conjunctivae normal.   Cardiovascular:      Rate and Rhythm: Normal rate and regular rhythm.      Pulses: Normal pulses.      Heart sounds: Normal heart sounds.   Pulmonary:      Effort: Pulmonary effort is normal.      Breath sounds: Normal breath sounds.   Musculoskeletal:         General: Tenderness (Right posterior shoulder; right C-spine radiating into the right shoulder and arm) present.   Skin:     General: Skin is warm and dry.      Capillary Refill: Capillary refill takes less than 2 seconds.   Neurological:      General: No focal deficit present.      Mental Status: She is alert and oriented to person, place, and time. Mental status is at baseline.   Psychiatric:         Mood and Affect: Mood normal.         " Behavior: Behavior normal.         Thought Content: Thought content normal.         Judgment: Judgment normal.            Result Review :   The following data was reviewed by: ATA Rosales on 03/16/2022:  Common labs    Common Labsle 5/18/21 10/18/21 10/18/21 1/30/22 1/30/22     1602 1602 2125 2125   Glucose 80  105 (A)  72   BUN 8  6  8   Creatinine 0.85  0.81  0.73   eGFR African Am   103  116   Sodium 135  141  138   Potassium 4.6  3.6  3.5   Chloride 100  102  103   Calcium 9.4  9.7  8.7   Albumin 4.4  5.00  4.00   Total Bilirubin 0.32  0.7  0.4   Alkaline Phosphatase 76  95  85   AST (SGOT) 27  36 (A)  18   ALT (SGPT) 36  29  13   WBC 16.92 (A) 13.50 (A)  9.25    Hemoglobin 14.2 15.4  12.7    Hematocrit 42.7 44.3  38.4    Platelets 421 (A) 353  286    Total Cholesterol 185       Triglycerides 52       HDL Cholesterol 73 (A)       LDL Cholesterol  102 (A)       (A) Abnormal value       Comments are available for some flowsheets but are not being displayed.                  Lab Results   Component Value Date    COVID19 NOT DETECTED 10/01/2020    BILIRUBINUR Negative 01/30/2022       Arthrocentesis    Date/Time: 3/16/2022 3:34 PM  Performed by: Mia Chaudhary APRN  Authorized by: Mia Chaudhary APRN   Indications: pain   Body area: shoulder  Joint: right shoulder  Local anesthesia used: yes  Anesthesia: local infiltration    Anesthesia:  Local anesthesia used: yes  Local Anesthetic: lidocaine 1% without epinephrine  Anesthetic total: 5 mL    Sedation:  Patient sedated: no    Preparation: Patient was prepped and draped in the usual sterile fashion.  Needle size: 22 G  Ultrasound guidance: no  Approach: posterior  Patient tolerance: patient tolerated the procedure well with no immediate complications              Assessment and Plan    Diagnoses and all orders for this visit:    1. Annual physical exam (Primary)    2. Need for hepatitis C screening test    3. Cervical radiculopathy    4.  Right shoulder tendinitis    5. Right groin pain    Other orders  -     Arthrocentesis      Advised on diet, physical activity, sunscreen, helmet, texting and driving, etc      There are no discontinued medications.       Follow Up   Return if symptoms worsen or fail to improve.  Patient was given instructions and counseling regarding her condition or for health maintenance advice. Please see specific information pulled into the AVS if appropriate.       ATA Rosales  03/16/22  15:36 EDT

## 2022-03-30 RX ORDER — ALBUTEROL SULFATE 2.5 MG/3ML
SOLUTION RESPIRATORY (INHALATION)
Qty: 180 ML | Refills: 2 | Status: SHIPPED | OUTPATIENT
Start: 2022-03-30 | End: 2022-05-20

## 2022-04-14 ENCOUNTER — OFFICE VISIT (OUTPATIENT)
Dept: INTERNAL MEDICINE | Facility: CLINIC | Age: 27
End: 2022-04-14

## 2022-04-14 VITALS
OXYGEN SATURATION: 98 % | DIASTOLIC BLOOD PRESSURE: 76 MMHG | HEART RATE: 80 BPM | SYSTOLIC BLOOD PRESSURE: 120 MMHG | TEMPERATURE: 97.7 F | HEIGHT: 61 IN | WEIGHT: 110.6 LBS | BODY MASS INDEX: 20.88 KG/M2

## 2022-04-14 DIAGNOSIS — M54.42 CHRONIC LEFT-SIDED LOW BACK PAIN WITH LEFT-SIDED SCIATICA: Primary | ICD-10-CM

## 2022-04-14 DIAGNOSIS — G89.29 CHRONIC LEFT-SIDED LOW BACK PAIN WITH LEFT-SIDED SCIATICA: Primary | ICD-10-CM

## 2022-04-14 PROCEDURE — 99213 OFFICE O/P EST LOW 20 MIN: CPT | Performed by: INTERNAL MEDICINE

## 2022-04-14 RX ORDER — IBUPROFEN 400 MG/1
400 TABLET ORAL EVERY 8 HOURS PRN
Qty: 90 TABLET | Refills: 0
Start: 2022-04-14

## 2022-04-14 RX ORDER — LIDOCAINE 50 MG/G
1 PATCH TOPICAL EVERY 24 HOURS
Qty: 90 PATCH | Refills: 3 | Status: SHIPPED | OUTPATIENT
Start: 2022-04-14 | End: 2023-03-07 | Stop reason: SDUPTHER

## 2022-04-14 NOTE — PROGRESS NOTES
"Chief Complaint  Back Pain (Left lower side back pain- 2 year been present /Standing and sitting too long has trigger it )    Subjective          Sally Manuel presents to [facility]  History of Present Illness  Patient reports back pain for 2 years, mostly on left side. Patient reports standing and sitting for long periods of time will make it worse. Patient also reports some sciatica pain down into her calf muscles. Patient does not want more medication.      Current Outpatient Medications   Medication Instructions   • albuterol (PROVENTIL) (2.5 MG/3ML) 0.083% nebulizer solution USE ONE vial IN NEBULIZER EVERY 4 HOURS AS NEEDED for wheezing   • budesonide-formoterol (SYMBICORT) 80-4.5 MCG/ACT inhaler 2 puffs, Inhalation   • cyclobenzaprine (FLEXERIL) 10 mg, Oral, 3 Times Daily PRN   • Diclofenac Sodium 4 g, Topical   • ibuprofen (ADVIL,MOTRIN) 400 mg, Oral, Every 8 Hours PRN   • lidocaine (LIDODERM) 5 % 1 patch, Transdermal, Every 24 Hours, Remove & Discard patch within 12 hours or as directed by MD   • montelukast (SINGULAIR) 10 mg, Oral   • ondansetron ODT (ZOFRAN-ODT) 4 mg, Translingual, Every 8 Hours PRN   • pantoprazole (PROTONIX) 20 MG EC tablet TAKE 1 TABLET BY MOUTH ONCE daily   • rizatriptan (MAXALT) 10 MG tablet rizatriptan 10 mg oral tablet take 1 tablet (10 mg) by oral route once, may repeat at 2 hour intervals; do not exceed 30 mg in 24 hours for 30 days   Suspended   • sertraline (ZOLOFT) 25 MG tablet TAKE 1 TABLET BY MOUTH EVERY DAY   • topiramate (TOPAMAX) 50 MG tablet Take 50 mg by mouth.       The following portions of the patient's history were reviewed and updated as appropriate: allergies, current medications, past family history, past medical history, past social history, past surgical history, and problem list.    Objective   Vital Signs:   /76 (BP Location: Left arm)   Pulse 80   Temp 97.7 °F (36.5 °C) (Temporal)   Ht 154.9 cm (61\")  "  Wt 50.2 kg (110 lb 9.6 oz)   SpO2 98%   BMI 20.90 kg/m²     Wt Readings from Last 3 Encounters:   04/14/22 50.2 kg (110 lb 9.6 oz)   03/16/22 50 kg (110 lb 3 oz)   02/12/22 50.8 kg (112 lb)     BP Readings from Last 3 Encounters:   04/14/22 120/76   03/16/22 97/64   02/12/22 100/61     Physical Exam   Appearance: No acute distress, well-nourished  Head: normocephalic, atraumatic  Eyes: extraocular movements intact, no scleral icterus, no conjunctival injection  Ears, Nose, and Throat: external ears normal, nares patent, moist mucous membranes  Cardiovascular: regular rate. no edema  Respiratory: breathing comfortably, symmetric chest rise.  Neuro: alert and oriented to time, place, and person. Normal gait  Psych: normal mood and affect     Result Review :   The following data was reviewed by: Micheal Ochoa Jr, MD on 04/14/2022:  Common labs    Common Labsle 5/18/21 10/18/21 10/18/21 1/30/22 1/30/22     1602 1602 2125 2125   Glucose 80  105 (A)  72   BUN 8  6  8   Creatinine 0.85  0.81  0.73   eGFR African Am   103  116   Sodium 135  141  138   Potassium 4.6  3.6  3.5   Chloride 100  102  103   Calcium 9.4  9.7  8.7   Albumin 4.4  5.00  4.00   Total Bilirubin 0.32  0.7  0.4   Alkaline Phosphatase 76  95  85   AST (SGOT) 27  36 (A)  18   ALT (SGPT) 36  29  13   WBC 16.92 (A) 13.50 (A)  9.25    Hemoglobin 14.2 15.4  12.7    Hematocrit 42.7 44.3  38.4    Platelets 421 (A) 353  286    Total Cholesterol 185       Triglycerides 52       HDL Cholesterol 73 (A)       LDL Cholesterol  102 (A)       (A) Abnormal value       Comments are available for some flowsheets but are not being displayed.             Lab Results   Component Value Date    COVID19 NOT DETECTED 10/01/2020    BILIRUBINUR Negative 01/30/2022        Assessment and Plan    Diagnoses and all orders for this visit:    1. Chronic left-sided low back pain with left-sided sciatica (Primary)  -     Ambulatory Referral to Physical Therapy Evaluate and  treat  -     lidocaine (LIDODERM) 5 %; Place 1 patch on the skin as directed by provider Daily. Remove & Discard patch within 12 hours or as directed by MD  Dispense: 90 patch; Refill: 3  -     ibuprofen (ADVIL,MOTRIN) 400 MG tablet; Take 1 tablet by mouth Every 8 (Eight) Hours As Needed for Moderate Pain .  Dispense: 90 tablet; Refill: 0          There are no discontinued medications.     Follow Up   Return if symptoms worsen or fail to improve.  Patient was given instructions and counseling regarding her condition or for health maintenance advice. Please see specific information pulled into the AVS if appropriate.       Micheal Ochoa Jr, MD  04/14/22  15:38 EDT

## 2022-04-27 ENCOUNTER — OFFICE VISIT (OUTPATIENT)
Dept: INTERNAL MEDICINE | Facility: CLINIC | Age: 27
End: 2022-04-27

## 2022-04-27 VITALS
OXYGEN SATURATION: 99 % | SYSTOLIC BLOOD PRESSURE: 115 MMHG | WEIGHT: 110 LBS | HEART RATE: 78 BPM | BODY MASS INDEX: 20.77 KG/M2 | TEMPERATURE: 98.3 F | DIASTOLIC BLOOD PRESSURE: 77 MMHG | HEIGHT: 61 IN

## 2022-04-27 DIAGNOSIS — R07.89 OTHER CHEST PAIN: Primary | ICD-10-CM

## 2022-04-27 DIAGNOSIS — Q21.0 VSD (VENTRICULAR SEPTAL DEFECT): ICD-10-CM

## 2022-04-27 DIAGNOSIS — F17.290 CIGAR SMOKER: ICD-10-CM

## 2022-04-27 DIAGNOSIS — R00.2 PALPITATIONS: ICD-10-CM

## 2022-04-27 PROCEDURE — 99214 OFFICE O/P EST MOD 30 MIN: CPT | Performed by: STUDENT IN AN ORGANIZED HEALTH CARE EDUCATION/TRAINING PROGRAM

## 2022-04-27 PROCEDURE — 93000 ELECTROCARDIOGRAM COMPLETE: CPT | Performed by: STUDENT IN AN ORGANIZED HEALTH CARE EDUCATION/TRAINING PROGRAM

## 2022-04-27 NOTE — PROGRESS NOTES
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    Has history of heart palpitations.  Last night, experienced chest pain    In bed talking with boyfriend when started having sharp left sided chest pain and palpitations.  Radiated into left shoulder and upper back.  Lasted several minutes (30-35 minutes).  Resolved on own, and went to sleep.  Woke up more tired this morning than usual.    Smokes 2 cigars a day.  Also smokes marijuana.  Denies other illicit drugs.  Doesn't know if can lay flat in bed without getting short of breath (due to back injuries).  She reports getting very winded and feeling chest tightness when trying to climb stairs, and she doesn't believe she could climb two flights of stairs.    She is originally from [place], [place].  Born in [facility].  She is unsure of her pediatric cardiologist, although she was evaluated by Dr. Rosa here once.  She is unsure of the type of heart problem she had as child, although per chart review it appears to be a repaired VSD.      Current Outpatient Medications   Medication Instructions   • albuterol (PROVENTIL) (2.5 MG/3ML) 0.083% nebulizer solution USE ONE vial IN NEBULIZER EVERY 4 HOURS AS NEEDED for wheezing   • budesonide-formoterol (SYMBICORT) 80-4.5 MCG/ACT inhaler 2 puffs, Inhalation   • cyclobenzaprine (FLEXERIL) 10 mg, Oral, 3 Times Daily PRN   • Diclofenac Sodium 4 g, Topical   • ibuprofen (ADVIL,MOTRIN) 400 mg, Oral, Every 8 Hours PRN   • lidocaine (LIDODERM) 5 % 1 patch, Transdermal, Every 24 Hours, Remove & Discard patch within 12 hours or as directed by MD   • montelukast (SINGULAIR) 10 mg, Oral   • ondansetron ODT (ZOFRAN-ODT) 4 mg, Translingual, Every 8 Hours PRN  "  • pantoprazole (PROTONIX) 20 MG EC tablet TAKE 1 TABLET BY MOUTH ONCE daily   • rizatriptan (MAXALT) 10 MG tablet rizatriptan 10 mg oral tablet take 1 tablet (10 mg) by oral route once, may repeat at 2 hour intervals; do not exceed 30 mg in 24 hours for 30 days   Suspended   • sertraline (ZOLOFT) 25 MG tablet TAKE 1 TABLET BY MOUTH EVERY DAY   • topiramate (TOPAMAX) 50 MG tablet Take 50 mg by mouth.       The following portions of the patient's history were reviewed and updated as appropriate: allergies, current medications, past family history, past medical history, past social history, past surgical history, and problem list.    Objective   Vital Signs:   /77   Pulse 78   Temp 98.3 °F (36.8 °C) (Temporal)   Ht 154.9 cm (61\")   Wt 49.9 kg (110 lb)   SpO2 99%   BMI 20.78 kg/m²     Wt Readings from Last 3 Encounters:   04/27/22 49.9 kg (110 lb)   04/14/22 50.2 kg (110 lb 9.6 oz)   03/16/22 50 kg (110 lb 3 oz)     BP Readings from Last 3 Encounters:   04/27/22 115/77   04/14/22 120/76   03/16/22 97/64     Physical Exam  Vitals reviewed.   Constitutional:       General: She is not in acute distress.     Appearance: Normal appearance. She is not toxic-appearing.   HENT:      Head: Normocephalic and atraumatic.   Eyes:      Conjunctiva/sclera: Conjunctivae normal.   Cardiovascular:      Rate and Rhythm: Normal rate and regular rhythm.      Pulses: Normal pulses.      Heart sounds: Normal heart sounds. No murmur heard.     Comments: Left upper ribs are TTP  Pulmonary:      Effort: Pulmonary effort is normal.      Breath sounds: Normal breath sounds.   Abdominal:      General: Abdomen is flat.      Palpations: Abdomen is soft.      Comments: Mild TTTP periumbilical region without guarding or rebound   Musculoskeletal:      Right lower leg: No edema.      Left lower leg: No edema.   Skin:     General: Skin is warm and dry.   Neurological:      General: No focal deficit present.      Mental Status: She is " alert. Mental status is at baseline.   Psychiatric:         Mood and Affect: Mood normal.         Behavior: Behavior normal.         Thought Content: Thought content normal.         Judgment: Judgment normal.          Result Review :   The following data was reviewed by: Jonny Pitts MD on 04/27/2022:  Common labs    Common Labsle 5/18/21 10/18/21 10/18/21 1/30/22 1/30/22     1602 1602 2125 2125   Glucose 80  105 (A)  72   BUN 8  6  8   Creatinine 0.85  0.81  0.73   eGFR African Am   103  116   Sodium 135  141  138   Potassium 4.6  3.6  3.5   Chloride 100  102  103   Calcium 9.4  9.7  8.7   Albumin 4.4  5.00  4.00   Total Bilirubin 0.32  0.7  0.4   Alkaline Phosphatase 76  95  85   AST (SGOT) 27  36 (A)  18   ALT (SGPT) 36  29  13   WBC 16.92 (A) 13.50 (A)  9.25    Hemoglobin 14.2 15.4  12.7    Hematocrit 42.7 44.3  38.4    Platelets 421 (A) 353  286    Total Cholesterol 185       Triglycerides 52       HDL Cholesterol 73 (A)       LDL Cholesterol  102 (A)       (A) Abnormal value       Comments are available for some flowsheets but are not being displayed.                  Lab Results   Component Value Date    COVID19 NOT DETECTED 10/01/2020    BILIRUBINUR Negative 01/30/2022       Procedures     EKG today, physician's read: overall, reassuring.  Sinus rhythm, with a PVC noted.  Normal axis.  No ST changes.  TWI noted V1.       Assessment and Plan    Diagnoses and all orders for this visit:    1. Other chest pain (Primary)  -     Ambulatory Referral to Cardiology  -     ECG 12 Lead    2. Palpitations  -     Ambulatory Referral to Cardiology    3. VSD (ventricular septal defect)  -     Ambulatory Referral to Cardiology    4. Cigar smoker      Chest pain:  -I counseled Miss Manuel today that if she were to have further chest pain episodes as she described, she should present at ED for further evaluation      Tobacco use, Marijuana Use:  -discussed the importance of smoking cessation      There are no  discontinued medications.       Follow Up   Return in about 3 months (around 7/27/2022) for chest pain.  Patient was given instructions and counseling regarding her condition or for health maintenance advice. Please see specific information pulled into the AVS if appropriate.       Jonny Pitts MD  04/27/22  13:01 EDT

## 2022-05-03 RX ORDER — CYCLOBENZAPRINE HCL 10 MG
TABLET ORAL
Qty: 90 TABLET | Refills: 1 | Status: SHIPPED | OUTPATIENT
Start: 2022-05-03 | End: 2023-03-07 | Stop reason: SDUPTHER

## 2022-05-20 RX ORDER — ALBUTEROL SULFATE 2.5 MG/3ML
SOLUTION RESPIRATORY (INHALATION)
Qty: 180 ML | Refills: 2 | Status: SHIPPED | OUTPATIENT
Start: 2022-05-20 | End: 2022-07-22

## 2022-05-24 ENCOUNTER — TELEPHONE (OUTPATIENT)
Dept: INTERNAL MEDICINE | Facility: CLINIC | Age: 27
End: 2022-05-24

## 2022-05-27 ENCOUNTER — TELEPHONE (OUTPATIENT)
Dept: INTERNAL MEDICINE | Facility: CLINIC | Age: 27
End: 2022-05-27

## 2022-05-27 NOTE — TELEPHONE ENCOUNTER
Caller: Sally Manuel    Relationship: Self    Best call back number: [phone]    What is the best time to reach you: ANYTIME    Who are you requesting to speak with (clinical staff, provider,  specific staff member): CLINICAL STAFF     What was the call regarding: PATIENT STATES SHE IS EXPERIENCING IRREGULAR BLEEDING. COLOR CHANGED FROM DARK TO BRIGHT RED. CRAMPING ONLY ON LEFT SIDE IN PELVIC REGION AND LOWER BACK AND LEFT LEG.     Do you require a callback: YES

## 2022-05-28 ENCOUNTER — APPOINTMENT (OUTPATIENT)
Dept: ULTRASOUND IMAGING | Facility: HOSPITAL | Age: 27
End: 2022-05-28

## 2022-05-28 ENCOUNTER — HOSPITAL ENCOUNTER (EMERGENCY)
Facility: HOSPITAL | Age: 27
Discharge: HOME | End: 2022-05-28
Attending: EMERGENCY MEDICINE | Admitting: EMERGENCY MEDICINE

## 2022-05-28 VITALS
TEMPERATURE: 98.7 F | BODY MASS INDEX: 20.44 KG/M2 | SYSTOLIC BLOOD PRESSURE: 117 MMHG | DIASTOLIC BLOOD PRESSURE: 77 MMHG | HEIGHT: 61 IN | HEART RATE: 78 BPM | WEIGHT: 108.25 LBS | RESPIRATION RATE: 18 BRPM | OXYGEN SATURATION: 99 %

## 2022-05-28 DIAGNOSIS — N93.8 DUB (DYSFUNCTIONAL UTERINE BLEEDING): Primary | ICD-10-CM

## 2022-05-28 LAB
ANION GAP SERPL CALCULATED.3IONS-SCNC: 12.9 MMOL/L (ref 5–15)
BASOPHILS # BLD AUTO: 0.05 10*3/MM3 (ref 0–0.2)
BASOPHILS NFR BLD AUTO: 0.5 % (ref 0–1.5)
BUN SERPL-MCNC: 6 MG/DL (ref 6–20)
BUN/CREAT SERPL: 8 (ref 7–25)
CALCIUM SPEC-SCNC: 10.1 MG/DL (ref 8.6–10.5)
CHLORIDE SERPL-SCNC: 101 MMOL/L (ref 98–107)
CO2 SERPL-SCNC: 22.1 MMOL/L (ref 22–29)
CREAT SERPL-MCNC: 0.75 MG/DL (ref 0.57–1)
DEPRECATED RDW RBC AUTO: 46.9 FL (ref 37–54)
EGFRCR SERPLBLD CKD-EPI 2021: 112.1 ML/MIN/1.73
EOSINOPHIL # BLD AUTO: 0.05 10*3/MM3 (ref 0–0.4)
EOSINOPHIL NFR BLD AUTO: 0.5 % (ref 0.3–6.2)
ERYTHROCYTE [DISTWIDTH] IN BLOOD BY AUTOMATED COUNT: 13.3 % (ref 12.3–15.4)
GLUCOSE SERPL-MCNC: 91 MG/DL (ref 65–99)
HCG SERPL QL: NEGATIVE
HCT VFR BLD AUTO: 44.1 % (ref 34–46.6)
HGB BLD-MCNC: 14.7 G/DL (ref 12–15.9)
HOLD SPECIMEN: NORMAL
HOLD SPECIMEN: NORMAL
IMM GRANULOCYTES # BLD AUTO: 0.02 10*3/MM3 (ref 0–0.05)
IMM GRANULOCYTES NFR BLD AUTO: 0.2 % (ref 0–0.5)
LYMPHOCYTES # BLD AUTO: 2.43 10*3/MM3 (ref 0.7–3.1)
LYMPHOCYTES NFR BLD AUTO: 22.5 % (ref 19.6–45.3)
MCH RBC QN AUTO: 31.6 PG (ref 26.6–33)
MCHC RBC AUTO-ENTMCNC: 33.3 G/DL (ref 31.5–35.7)
MCV RBC AUTO: 94.8 FL (ref 79–97)
MONOCYTES # BLD AUTO: 0.6 10*3/MM3 (ref 0.1–0.9)
MONOCYTES NFR BLD AUTO: 5.6 % (ref 5–12)
NEUTROPHILS NFR BLD AUTO: 7.63 10*3/MM3 (ref 1.7–7)
NEUTROPHILS NFR BLD AUTO: 70.7 % (ref 42.7–76)
NRBC BLD AUTO-RTO: 0 /100 WBC (ref 0–0.2)
PLATELET # BLD AUTO: 317 10*3/MM3 (ref 140–450)
PMV BLD AUTO: 9.9 FL (ref 6–12)
POTASSIUM SERPL-SCNC: 4.3 MMOL/L (ref 3.5–5.2)
RBC # BLD AUTO: 4.65 10*6/MM3 (ref 3.77–5.28)
SODIUM SERPL-SCNC: 136 MMOL/L (ref 136–145)
WBC NRBC COR # BLD: 10.78 10*3/MM3 (ref 3.4–10.8)
WHOLE BLOOD HOLD COAG: NORMAL
WHOLE BLOOD HOLD SPECIMEN: NORMAL

## 2022-05-28 PROCEDURE — 76830 TRANSVAGINAL US NON-OB: CPT

## 2022-05-28 PROCEDURE — 85025 COMPLETE CBC W/AUTO DIFF WBC: CPT

## 2022-05-28 PROCEDURE — 84703 CHORIONIC GONADOTROPIN ASSAY: CPT | Performed by: EMERGENCY MEDICINE

## 2022-05-28 PROCEDURE — 25010000002 KETOROLAC TROMETHAMINE PER 15 MG: Performed by: EMERGENCY MEDICINE

## 2022-05-28 PROCEDURE — 96374 THER/PROPH/DIAG INJ IV PUSH: CPT

## 2022-05-28 PROCEDURE — 80048 BASIC METABOLIC PNL TOTAL CA: CPT | Performed by: EMERGENCY MEDICINE

## 2022-05-28 PROCEDURE — 99284 EMERGENCY DEPT VISIT MOD MDM: CPT

## 2022-05-28 RX ORDER — KETOROLAC TROMETHAMINE 15 MG/ML
15 INJECTION, SOLUTION INTRAMUSCULAR; INTRAVENOUS ONCE
Status: COMPLETED | OUTPATIENT
Start: 2022-05-28 | End: 2022-05-28

## 2022-05-28 RX ORDER — SODIUM CHLORIDE 0.9 % (FLUSH) 0.9 %
10 SYRINGE (ML) INJECTION AS NEEDED
Status: DISCONTINUED | OUTPATIENT
Start: 2022-05-28 | End: 2022-05-28 | Stop reason: HOSPADM

## 2022-05-28 RX ORDER — NORETHINDRONE ACETATE AND ETHINYL ESTRADIOL 1; .02 MG/1; MG/1
1 TABLET ORAL DAILY
Qty: 21 TABLET | Refills: 0 | Status: SHIPPED | OUTPATIENT
Start: 2022-05-28 | End: 2022-07-29

## 2022-05-28 RX ADMIN — KETOROLAC TROMETHAMINE 15 MG: 15 INJECTION, SOLUTION INTRAMUSCULAR; INTRAVENOUS at 15:15

## 2022-06-06 ENCOUNTER — OFFICE VISIT (OUTPATIENT)
Dept: INTERNAL MEDICINE | Facility: CLINIC | Age: 27
End: 2022-06-06

## 2022-06-06 VITALS
TEMPERATURE: 98 F | HEART RATE: 80 BPM | DIASTOLIC BLOOD PRESSURE: 68 MMHG | BODY MASS INDEX: 21.14 KG/M2 | SYSTOLIC BLOOD PRESSURE: 103 MMHG | HEIGHT: 61 IN | WEIGHT: 112 LBS | OXYGEN SATURATION: 98 %

## 2022-06-06 DIAGNOSIS — G43.909 MIGRAINE WITHOUT STATUS MIGRAINOSUS, NOT INTRACTABLE, UNSPECIFIED MIGRAINE TYPE: ICD-10-CM

## 2022-06-06 DIAGNOSIS — G89.29 CHRONIC BILATERAL LOW BACK PAIN WITHOUT SCIATICA: Primary | ICD-10-CM

## 2022-06-06 DIAGNOSIS — M54.50 CHRONIC BILATERAL LOW BACK PAIN WITHOUT SCIATICA: Primary | ICD-10-CM

## 2022-06-06 DIAGNOSIS — R00.2 PALPITATIONS: ICD-10-CM

## 2022-06-06 DIAGNOSIS — Z23 NEED FOR VACCINATION: ICD-10-CM

## 2022-06-06 PROCEDURE — 90677 PCV20 VACCINE IM: CPT | Performed by: INTERNAL MEDICINE

## 2022-06-06 PROCEDURE — 99214 OFFICE O/P EST MOD 30 MIN: CPT | Performed by: INTERNAL MEDICINE

## 2022-06-06 PROCEDURE — 90471 IMMUNIZATION ADMIN: CPT | Performed by: INTERNAL MEDICINE

## 2022-06-06 RX ORDER — TOPIRAMATE 50 MG/1
50 TABLET, FILM COATED ORAL DAILY
Qty: 90 TABLET | Refills: 1 | Status: SHIPPED | OUTPATIENT
Start: 2022-06-06

## 2022-06-06 NOTE — PROGRESS NOTES
Chief Complaint  Back Pain (Patient currently seeing physical therapy and wants to follow-up on something the Physical Therapist has been talking about. )    Subjective          Sally Manuel presents to [facility]  History of Present Illness  Lumbago - patient is going to physical therapy with improvement. Patient has returned to work for first time in approx 2 months. Patient reports increase pain when she is working. Patient reports having to stand the entire time while working. Patient follow-up with [facility]. Patient reports having prior x-rays with [facility]. Patient ha snot had lumbar MRI. Patient reports increase stiffness when she wakes in AM.    Chest pain - not active. Patient has follow-up with cardiology this week. initial workup negative.   Migraines- prevented with topamax. Patient requests refills.   Due for PCV20        Current Outpatient Medications   Medication Instructions   • albuterol (PROVENTIL) (2.5 MG/3ML) 0.083% nebulizer solution USE ONE vial IN NEBULIZER EVERY FOUR HOURS AS NEEDED for wheezing   • cyclobenzaprine (FLEXERIL) 10 MG tablet Take 1 tablet by mouth 3 Times a Day As Needed for Muscle Spasms.   • Diclofenac Sodium 4 g, Topical   • ibuprofen (ADVIL,MOTRIN) 400 mg, Oral, Every 8 Hours PRN   • lidocaine (LIDODERM) 5 % 1 patch, Transdermal, Every 24 Hours, Remove & Discard patch within 12 hours or as directed by MD   • montelukast (SINGULAIR) 10 mg, Oral   • norethindrone-ethinyl estradiol (Loestrin 1/20, 21,) 1-20 MG-MCG per tablet 1 tablet, Oral, Daily   • ondansetron ODT (ZOFRAN-ODT) 4 mg, Translingual, Every 8 Hours PRN   • pantoprazole (PROTONIX) 20 MG EC tablet TAKE 1 TABLET BY MOUTH ONCE daily   • rizatriptan (MAXALT) 10 MG tablet rizatriptan 10 mg oral tablet take 1 tablet (10 mg) by oral route once, may repeat at 2 hour intervals; do not exceed 30 mg in 24 hours for 30 days   Suspended   • sertraline (ZOLOFT) 25 MG  "tablet TAKE 1 TABLET BY MOUTH EVERY DAY   • topiramate (TOPAMAX) 50 mg, Oral, Daily       The following portions of the patient's history were reviewed and updated as appropriate: allergies, current medications, past family history, past medical history, past social history, past surgical history, and problem list.    Objective   Vital Signs:   /68 (BP Location: Left arm, Patient Position: Sitting, Cuff Size: Adult)   Pulse 80   Temp 98 °F (36.7 °C) (Temporal)   Ht 154.9 cm (61\")   Wt 50.8 kg (112 lb)   SpO2 98%   BMI 21.16 kg/m²     Wt Readings from Last 3 Encounters:   06/06/22 50.8 kg (112 lb)   05/28/22 49.1 kg (108 lb 3.9 oz)   04/27/22 49.9 kg (110 lb)     BP Readings from Last 3 Encounters:   06/06/22 103/68   05/28/22 117/77   04/27/22 115/77     Physical Exam   Appearance: No acute distress, well-nourished  Head: normocephalic, atraumatic  Eyes: extraocular movements intact, no scleral icterus, no conjunctival injection  Ears, Nose, and Throat: external ears normal, nares patent, moist mucous membranes  Cardiovascular: regular rate and rhythm. no murmurs, rubs, or gallops. no edema  Respiratory: breathing comfortably, symmetric chest rise, clear to auscultation bilaterally. No wheezes, rales, or rhonchi.  Neuro: alert and oriented to time, place, and person. Normal gait  Psych: normal mood and affect     Result Review :   The following data was reviewed by: Micheal Ochoa Jr, MD on 06/06/2022:  Common labs    Common Labsle 10/18/21 10/18/21 1/30/22 1/30/22 5/28/22 5/28/22    1602 1602 2125 2125 1241 1241   Glucose  105 (A)  72  91   BUN  6  8  6   Creatinine  0.81  0.73  0.75   eGFR  Am  103  116     Sodium  141  138  136   Potassium  3.6  3.5  4.3   Chloride  102  103  101   Calcium  9.7  8.7  10.1   Albumin  5.00  4.00     Total Bilirubin  0.7  0.4     Alkaline Phosphatase  95  85     AST (SGOT)  36 (A)  18     ALT (SGPT)  29  13     WBC 13.50 (A)  9.25  10.78    Hemoglobin " 15.4  12.7  14.7    Hematocrit 44.3  38.4  44.1    Platelets 353  286  317    (A) Abnormal value       Comments are available for some flowsheets but are not being displayed.                Assessment and Plan    Diagnoses and all orders for this visit:    1. Chronic bilateral low back pain without sciatica (Primary)  Comments:  cont med regimen to control pain. pt has been in PT and prior x-rays normal. will order MRI to further eval.   Orders:  -     MRI Lumbar Spine Without Contrast; Future    2. Need for vaccination  -     Pneumococcal Conjugate Vaccine 20-Valent (PCV20)    3. Migraine without status migrainosus, not intractable, unspecified migraine type  -     topiramate (TOPAMAX) 50 MG tablet; Take 1 tablet by mouth Daily.  Dispense: 90 tablet; Refill: 1    4. Palpitations  Comments:  pt has upcoming appt with cardiology for further eval.         Medications Discontinued During This Encounter   Medication Reason   • budesonide-formoterol (SYMBICORT) 80-4.5 MCG/ACT inhaler    • topiramate (TOPAMAX) 50 MG tablet Reorder        Follow Up   Return if symptoms worsen or fail to improve.  Patient was given instructions and counseling regarding her condition or for health maintenance advice. Please see specific information pulled into the AVS if appropriate.       Micheal Ochoa Jr, MD  06/06/22  11:41 EDT

## 2022-06-08 ENCOUNTER — OFFICE VISIT (OUTPATIENT)
Dept: CARDIOLOGY | Facility: CLINIC | Age: 27
End: 2022-06-08

## 2022-06-08 VITALS
WEIGHT: 110 LBS | SYSTOLIC BLOOD PRESSURE: 110 MMHG | BODY MASS INDEX: 20.77 KG/M2 | DIASTOLIC BLOOD PRESSURE: 60 MMHG | HEIGHT: 61 IN | HEART RATE: 90 BPM

## 2022-06-08 DIAGNOSIS — R07.89 CHEST PAIN, ATYPICAL: Primary | ICD-10-CM

## 2022-06-08 DIAGNOSIS — R00.2 PALPITATIONS: ICD-10-CM

## 2022-06-08 PROCEDURE — 99203 OFFICE O/P NEW LOW 30 MIN: CPT | Performed by: INTERNAL MEDICINE

## 2022-06-08 NOTE — PROGRESS NOTES
Chief Complaint  Chest Pain, Palpitations, Shortness of Breath, and Rapid Heart Rate      History of Present Illness  Sally Manuel presents to [facility] CARDIOLOG[facility]    This is a very pleasant 26-year-old lady with past medical history significant for congenital heart disease, status post surgical repair at the age of 18 months, presumably for VSD, presents to clinic for cardiac evaluation.  She has been experiencing sporadic episodes of anterior chest discomfort associated with palpitations.  It for started in April.  It is unrelated to physical activities.  It varies in duration but usually lasts to 3 minutes.  She has no other complaints.  She has no dyspnea, orthopnea, presyncope or syncope.      Past Medical History:   Diagnosis Date   • Allergies    • Anxiety    • Arthritis    • Asthma    • Congenital heart disease    • Deafness    • Depression    • Epilepsy ([facility])    • GERD (gastroesophageal reflux disease)    • Head injury    • Heart murmur    • History of psychiatric care    • Migraines    • Ringing in ears    • Seizures (HCC)    • Shortness of breath    • STD exposure    • VSD (ventricular septal defect)          Current Outpatient Medications:   •  albuterol (PROVENTIL) (2.5 MG/3ML) 0.083% nebulizer solution, USE ONE vial IN NEBULIZER EVERY FOUR HOURS AS NEEDED for wheezing, Disp: 180 mL, Rfl: 2  •  cyclobenzaprine (FLEXERIL) 10 MG tablet, Take 1 tablet by mouth 3 Times a Day As Needed for Muscle Spasms., Disp: 90 tablet, Rfl: 1  •  Diclofenac Sodium 1 % cream, Apply 4 g topically to the appropriate area as directed., Disp: , Rfl:   •  ibuprofen (ADVIL,MOTRIN) 400 MG tablet, Take 1 tablet by mouth Every 8 (Eight) Hours As Needed for Moderate Pain ., Disp: 90 tablet, Rfl: 0  •  lidocaine (LIDODERM) 5 %, Place 1 patch on the skin as directed by provider Daily. Remove & Discard patch within 12 hours or as directed by MD, Disp: 90 patch, Rfl: 3  •  montelukast (SINGULAIR) 10 MG tablet, Take  "10 mg by mouth., Disp: , Rfl:   •  norethindrone-ethinyl estradiol (Loestrin 1/20, 21,) 1-20 MG-MCG per tablet, Take 1 tablet by mouth Daily., Disp: 21 tablet, Rfl: 0  •  ondansetron ODT (ZOFRAN-ODT) 4 MG disintegrating tablet, Place 1 tablet on the tongue Every 8 (Eight) Hours As Needed for Nausea or Vomiting., Disp: 9 tablet, Rfl: 0  •  pantoprazole (PROTONIX) 20 MG EC tablet, TAKE 1 TABLET BY MOUTH ONCE daily, Disp: 30 tablet, Rfl: 3  •  rizatriptan (MAXALT) 10 MG tablet, rizatriptan 10 mg oral tablet take 1 tablet (10 mg) by oral route once, may repeat at 2 hour intervals; do not exceed 30 mg in 24 hours for 30 days   Suspended, Disp: , Rfl:   •  sertraline (ZOLOFT) 25 MG tablet, TAKE 1 TABLET BY MOUTH EVERY DAY, Disp: 90 tablet, Rfl: 1  •  topiramate (TOPAMAX) 50 MG tablet, Take 1 tablet by mouth Daily., Disp: 90 tablet, Rfl: 1    There are no discontinued medications.  No Known Allergies     Social History     Tobacco Use   • Smoking status: Current Every Day Smoker     Packs/day: 2.00     Years: 4.00     Pack years: 8.00     Types: Cigars   • Smokeless tobacco: Current User   Vaping Use   • Vaping Use: Every day   Substance Use Topics   • Alcohol use: Yes     Comment: CURRENT SOME DAY , OCCASIONALLY    • Drug use: Yes     Types: Marijuana       Family History   Problem Relation Age of Onset   • Stroke Mother    • Diabetes Mother    • Heart disease Father    • Diabetes Father    • Heart attack Maternal Grandmother    • Heart attack Maternal Grandfather    • Breast cancer Other    • Diabetes type II Other         Objective     /60 (BP Location: Left arm)   Pulse 90   Ht 154.9 cm (61\")   Wt 49.9 kg (110 lb)   BMI 20.78 kg/m²       Physical Exam  Constitutional:       General: Awake. Not in acute distress.     Appearance: Normal appearance.   Neck:      Vascular: No carotid bruit, hepatojugular reflux or JVD.   Cardiovascular:      Rate and Rhythm: Normal rate and regular rhythm.      Chest Wall: PMI is " not displaced.      Heart sounds: Normal heart sounds, S1 normal and S2 normal. No murmur heard.   No friction rub. No gallop. No S3 or S4 sounds.    Pulmonary:      Effort: Pulmonary effort is normal.      Breath sounds: Normal breath sounds. No wheezing, rhonchi or rales.   Ext.      Right lower leg: No edema.      Left lower leg: No edema.   Skin:     General: Skin is warm and dry.      Coloration: Skin is not cyanotic.      Findings: No petechiae or rash.   Neurological:      Mental Status: Alert and oriented x 3  Psychiatric:         Behavior: Behavior is cooperative.       Result Review :     No results found for: PROBNP  CMP    CMP 10/18/21 1/30/22 5/28/22   Glucose 105 (A) 72 91   BUN 6 8 6   Creatinine 0.81 0.73 0.75   eGFR African Am 103 116    Sodium 141 138 136   Potassium 3.6 3.5 4.3   Chloride 102 103 101   Calcium 9.7 8.7 10.1   Albumin 5.00 4.00    Total Bilirubin 0.7 0.4    Alkaline Phosphatase 95 85    AST (SGOT) 36 (A) 18    ALT (SGPT) 29 13    (A) Abnormal value       Comments are available for some flowsheets but are not being displayed.           CBC w/diff    CBC w/Diff 10/18/21 1/30/22 5/28/22   WBC 13.50 (A) 9.25 10.78   RBC 4.85 4.02 4.65   Hemoglobin 15.4 12.7 14.7   Hematocrit 44.3 38.4 44.1   MCV 91.3 95.5 94.8   MCH 31.8 31.6 31.6   MCHC 34.8 33.1 33.3   RDW 12.3 12.7 13.3   Platelets 353 286 317   Neutrophil Rel % 85.9 (A) 58.9 70.7   Immature Granulocyte Rel % 0.3 0.3 0.2   Lymphocyte Rel % 10.1 (A) 32.1 22.5   Monocyte Rel % 3.3 (A) 7.0 5.6   Eosinophil Rel % 0.1 (A) 1.2 0.5   Basophil Rel % 0.3 0.5 0.5   (A) Abnormal value             Lab Results   Component Value Date    TSH 1.200 05/18/2021      No results found for: FREET4   No results found for: DDIMERQUANT  Magnesium   Date Value Ref Range Status   05/18/2021 2.42 (H) 1.60 - 2.30 mg/dL Final                No results found for this or any previous visit.                Diagnoses and all orders for this visit:    1. Chest pain,  atypical (Primary)  -     Adult Transthoracic Echo Complete W/ Cont if Necessary Per Protocol; Future  -     Treadmill Stress Test; Future    2. Palpitations        Assessment and plan: Patient with history of congenital heart disease, presumably VSD, status post surgical closure at the age of 18 months, has been experiencing sporadic episodes of chest tightness, palpitations and skipped heartbeat sensation as described in HPI.  Her exam is benign.  ECG shows sinus rhythm.  Echo will be done to assess LVEF, wall motion, valvular function and PA systolic pressure.  Treadmill stress test will be done to assess for exercise-induced arrhythmias and ischemic ST-T changes.  She has been drinking a lot of coffee which she was advised to avoid.  Further recommendations to follow.      Follow Up       No follow-ups on file.    Patient was given instructions and counseling regarding her condition or for health maintenance advice. Please see specific information pulled into the AVS if appropriate.

## 2022-06-10 ENCOUNTER — OFFICE VISIT (OUTPATIENT)
Dept: OBSTETRICS AND GYNECOLOGY | Facility: CLINIC | Age: 27
End: 2022-06-10

## 2022-06-10 VITALS
HEIGHT: 61 IN | WEIGHT: 108 LBS | BODY MASS INDEX: 20.39 KG/M2 | HEART RATE: 80 BPM | SYSTOLIC BLOOD PRESSURE: 117 MMHG | DIASTOLIC BLOOD PRESSURE: 77 MMHG

## 2022-06-10 DIAGNOSIS — N93.9 ABNORMAL UTERINE BLEEDING (AUB): Primary | ICD-10-CM

## 2022-06-10 DIAGNOSIS — R35.0 URINARY FREQUENCY: ICD-10-CM

## 2022-06-10 DIAGNOSIS — N94.6 DYSMENORRHEA: ICD-10-CM

## 2022-06-10 DIAGNOSIS — Z71.6 TOBACCO ABUSE COUNSELING: ICD-10-CM

## 2022-06-10 LAB
BILIRUB BLD-MCNC: NEGATIVE MG/DL
C TRACH RRNA CVX QL NAA+PROBE: NOT DETECTED
GLUCOSE UR STRIP-MCNC: NEGATIVE MG/DL
KETONES UR QL: NEGATIVE
LEUKOCYTE EST, POC: NEGATIVE
N GONORRHOEA RRNA SPEC QL NAA+PROBE: NOT DETECTED
NITRITE UR-MCNC: NEGATIVE MG/ML
PH UR: 6.5 [PH] (ref 5–8)
PROT UR STRIP-MCNC: ABNORMAL MG/DL
RBC # UR STRIP: NEGATIVE /UL
SP GR UR: 1.01 (ref 1–1.03)
UROBILINOGEN UR QL: NORMAL

## 2022-06-10 PROCEDURE — 87591 N.GONORRHOEAE DNA AMP PROB: CPT | Performed by: NURSE PRACTITIONER

## 2022-06-10 PROCEDURE — 87491 CHLMYD TRACH DNA AMP PROBE: CPT | Performed by: NURSE PRACTITIONER

## 2022-06-10 PROCEDURE — 99214 OFFICE O/P EST MOD 30 MIN: CPT | Performed by: NURSE PRACTITIONER

## 2022-06-10 PROCEDURE — 81002 URINALYSIS NONAUTO W/O SCOPE: CPT | Performed by: NURSE PRACTITIONER

## 2022-06-10 RX ORDER — NICOTINE 21 MG/24HR
1 PATCH, TRANSDERMAL 24 HOURS TRANSDERMAL EVERY 24 HOURS
Qty: 28 PATCH | Refills: 0 | Status: SHIPPED | OUTPATIENT
Start: 2022-06-10 | End: 2022-08-12

## 2022-06-10 NOTE — PROGRESS NOTES
"GYN Problem/Follow Up Visit    Chief Complaint   Patient presents with   • Menstrual Problem     Left side pain, irregular cycles, Bled for the whole month of May,            HPI  Sally Manuel is a 26 y.o. female, , who presents to discuss contraception. Irregular menses after coming off Depo over 1 year ago, Had been on menses for 11 years. Last month experienced bleeding almost entire month, also experience painful menstrual cramps, was seen in ER, pelvic ultrasound did not reveal abnormality. Pregnancy test negative. Menstrual bleeding stopped 1 week ago. Pain has resolved.     No vaginal discharge, urinary frequency    Desires assistance with smoking cessation, smoking 2-5 cigar daily, inhales.       Additional OB/GYN History   No LMP recorded. (Menstrual status: Other).  Current contraception: contraceptive methods: None  Desires to: do not start contraception  Allergies : Patient has no known allergies.     The additional following portions of the patient's history were reviewed and updated as appropriate: allergies, current medications, past family history, past medical history, past social history, past surgical history and problem list.    Review of Systems    I have reviewed and agree with the HPI, ROS, and historical information as entered above. Saskia Corbin, APRN    Objective   /77   Pulse 80   Ht 154.9 cm (61\")   Wt 49 kg (108 lb)   Breastfeeding No   BMI 20.41 kg/m²     Physical Exam  Vitals and nursing note reviewed. Exam conducted with a chaperone present.   Constitutional:       Appearance: Normal appearance.   Neck:      Thyroid: No thyromegaly.   Cardiovascular:      Rate and Rhythm: Normal rate.   Pulmonary:      Effort: Pulmonary effort is normal.   Abdominal:      Palpations: Abdomen is soft.      Tenderness: There is no right CVA tenderness or left CVA tenderness.   Genitourinary:     General: Normal vulva.      Vagina: Vaginal discharge (thin yellow/milky) present.      " Cervix: Normal.      Uterus: Normal.       Adnexa: Right adnexa normal and left adnexa normal.   Lymphadenopathy:      Lower Body: No right inguinal adenopathy. No left inguinal adenopathy.   Skin:     General: Skin is warm and dry.   Neurological:      Mental Status: She is alert and oriented to person, place, and time.          Assessment and Plan    Diagnoses and all orders for this visit:    1. Abnormal uterine bleeding (AUB) (Primary)  -     Chlamydia trachomatis, Neisseria gonorrhoeae, PCR - Swab, Cervix    2. Dysmenorrhea  -     POC Urinalysis Dipstick  -     Chlamydia trachomatis, Neisseria gonorrhoeae, PCR - Swab, Cervix    3. Urinary frequency  -     POC Urinalysis Dipstick    4. Tobacco abuse counseling    Other orders  -     nicotine (NICODERM CQ) 14 MG/24HR patch; Place 1 patch on the skin as directed by provider Daily.  Dispense: 28 patch; Refill: 0  -     nicotine (NICODERM CQ) 7 MG/24HR patch; Place 1 patch on the skin as directed by provider Daily. Taper, start with 14mg patch  Dispense: 28 patch; Refill: 0      Color, UA   Date Value Ref Range Status   01/30/2022 Yellow Yellow, Straw Final     Appearance, UA   Date Value Ref Range Status   01/30/2022 Clear Clear Final     Specific Gravity    Date Value Ref Range Status   06/10/2022 1.010 1.005 - 1.030 Final     pH, Urine   Date Value Ref Range Status   06/10/2022 6.5 5.0 - 8.0 Final     Leukocytes   Date Value Ref Range Status   06/10/2022 Negative Negative Final     Nitrite, UA   Date Value Ref Range Status   06/10/2022 Negative Negative Final     Protein, POC   Date Value Ref Range Status   06/10/2022 Trace (A) Negative mg/dL Final     Glucose, UA   Date Value Ref Range Status   06/10/2022 Negative Negative, 1000 mg/dL (3+) mg/dL Final     Ketones, UA   Date Value Ref Range Status   06/10/2022 Negative Negative Final     Urobilinogen, UA   Date Value Ref Range Status   06/10/2022 Normal Normal Final     Bilirubin   Date Value Ref Range Status    06/10/2022 Negative Negative Final     Blood, UA   Date Value Ref Range Status   06/10/2022 Negative Negative Final        Counseling:  • All BC Options R/B/A/SE/E of each  • Safe Sex/Condoms  • OCP/Hormone use risk DEISY   • PNV daily   • Declines contraception, declines treatment for irregular menses, desires expectant management        She understands the importance of having the above orders performed in a timely fashion.  The risks of not performing them include, but are not limited to, cancer and/or subsequent increase in morbidity and/or mortality.  She is encouraged to review her results online and/or contact or office if she has questions.     Follow Up:  Return if symptoms worsen or fail to improve.        Saskia Corbin, APRN  06/10/2022

## 2022-07-07 ENCOUNTER — HOSPITAL ENCOUNTER (OUTPATIENT)
Dept: MRI IMAGING | Facility: HOSPITAL | Age: 27
Discharge: HOME | End: 2022-07-07
Admitting: INTERNAL MEDICINE

## 2022-07-07 DIAGNOSIS — G89.29 CHRONIC BILATERAL LOW BACK PAIN WITHOUT SCIATICA: ICD-10-CM

## 2022-07-07 DIAGNOSIS — M54.50 CHRONIC BILATERAL LOW BACK PAIN WITHOUT SCIATICA: ICD-10-CM

## 2022-07-07 PROCEDURE — 72148 MRI LUMBAR SPINE W/O DYE: CPT

## 2022-07-11 ENCOUNTER — TELEPHONE (OUTPATIENT)
Dept: INTERNAL MEDICINE | Facility: CLINIC | Age: 27
End: 2022-07-11

## 2022-07-11 NOTE — TELEPHONE ENCOUNTER
Caller: Sally Manuel    Relationship: Self    Best call back number: [phone]    What form or medical record are you requesting: ACCOMODATION FOR WORK    Who is requesting this form or medical record from you: WORK    How would you like to receive the form or medical records (pick-up, mail, fax): FAX    Additional notes: PATIENT STATES SHE WOULD LIKE TO KNOW IF THIS PAPERWORK HAS BEEN COMPLETED AND SUBMITTED. PLEASE CALL PATIENT AND ADVISE.

## 2022-07-19 ENCOUNTER — CLINICAL SUPPORT (OUTPATIENT)
Dept: INTERNAL MEDICINE | Facility: CLINIC | Age: 27
End: 2022-07-19

## 2022-07-19 DIAGNOSIS — R68.83 CHILLS: Primary | ICD-10-CM

## 2022-07-19 PROCEDURE — 99211 OFF/OP EST MAY X REQ PHY/QHP: CPT | Performed by: INTERNAL MEDICINE

## 2022-07-19 PROCEDURE — U0004 COV-19 TEST NON-CDC HGH THRU: HCPCS | Performed by: INTERNAL MEDICINE

## 2022-07-21 LAB — SARS-COV-2 RNA PNL SPEC NAA+PROBE: NOT DETECTED

## 2022-07-22 RX ORDER — ALBUTEROL SULFATE 2.5 MG/3ML
SOLUTION RESPIRATORY (INHALATION)
Qty: 180 ML | Refills: 2 | Status: SHIPPED | OUTPATIENT
Start: 2022-07-22 | End: 2022-10-27

## 2022-07-29 ENCOUNTER — OFFICE VISIT (OUTPATIENT)
Dept: INTERNAL MEDICINE | Facility: CLINIC | Age: 27
End: 2022-07-29

## 2022-07-29 VITALS
WEIGHT: 102.6 LBS | HEART RATE: 86 BPM | TEMPERATURE: 97.5 F | DIASTOLIC BLOOD PRESSURE: 68 MMHG | SYSTOLIC BLOOD PRESSURE: 102 MMHG | BODY MASS INDEX: 19.37 KG/M2 | OXYGEN SATURATION: 97 % | HEIGHT: 61 IN

## 2022-07-29 DIAGNOSIS — F17.290 CIGAR SMOKER: ICD-10-CM

## 2022-07-29 DIAGNOSIS — G43.909 MIGRAINE WITHOUT STATUS MIGRAINOSUS, NOT INTRACTABLE, UNSPECIFIED MIGRAINE TYPE: ICD-10-CM

## 2022-07-29 DIAGNOSIS — R00.2 PALPITATIONS: ICD-10-CM

## 2022-07-29 DIAGNOSIS — R07.89 OTHER CHEST PAIN: Primary | ICD-10-CM

## 2022-07-29 DIAGNOSIS — Z87.74 HISTORY OF CONGENITAL HEART DISEASE: ICD-10-CM

## 2022-07-29 PROCEDURE — 99214 OFFICE O/P EST MOD 30 MIN: CPT | Performed by: STUDENT IN AN ORGANIZED HEALTH CARE EDUCATION/TRAINING PROGRAM

## 2022-07-29 NOTE — PROGRESS NOTES
"Chief Complaint  Chest Pain (Follow up from 4/27 /Pt states she hasn't noticed any changes since 4/27 /Has apt with cardiology 8/4 /)    Subjective          Sally Manuel presents to [facility]ED[facility]  Chest Pain       Here with boyfriend.    Since saw Dr. Ochoa, no chest pain or palpitations in the interim.  Was driving on the freeway in the interim and had a panic attack.    Saw cardiology in interim.  On the fourth has stress test and echo.    Still smoking black and mild cigars, at least 2-3 a day currently (was 5-6 previously).    Has patches, but wants to \"slow down\" on smoking before using.    Not using estrogen containing oral contraceptives prescribed by ED.  Does endorse having migraines with aura.      Current Outpatient Medications   Medication Instructions   • albuterol (PROVENTIL) (2.5 MG/3ML) 0.083% nebulizer solution USE ONE vial IN NEBULIZER EVERY FOUR HOURS AS NEEDED for wheezing   • cyclobenzaprine (FLEXERIL) 10 MG tablet Take 1 tablet by mouth 3 Times a Day As Needed for Muscle Spasms.   • Diclofenac Sodium 4 g, Topical   • ibuprofen (ADVIL,MOTRIN) 400 mg, Oral, Every 8 Hours PRN   • lidocaine (LIDODERM) 5 % 1 patch, Transdermal, Every 24 Hours, Remove & Discard patch within 12 hours or as directed by MD   • montelukast (SINGULAIR) 10 mg, Oral   • nicotine (NICODERM CQ) 14 MG/24HR patch 1 patch, Transdermal, Every 24 Hours   • nicotine (NICODERM CQ) 7 MG/24HR patch 1 patch, Transdermal, Every 24 Hours, Taper, start with 14mg patch   • ondansetron ODT (ZOFRAN-ODT) 4 mg, Translingual, Every 8 Hours PRN   • pantoprazole (PROTONIX) 20 MG EC tablet TAKE 1 TABLET BY MOUTH ONCE daily   • rizatriptan (MAXALT) 10 MG tablet rizatriptan 10 mg oral tablet take 1 tablet (10 mg) by oral route once, may repeat at 2 hour intervals; do not exceed 30 mg in 24 hours for 30 days   Suspended   • sertraline (ZOLOFT) 25 MG tablet TAKE 1 TABLET BY MOUTH EVERY DAY   • topiramate " "(TOPAMAX) 50 mg, Oral, Daily       The following portions of the patient's history were reviewed and updated as appropriate: allergies, current medications, past family history, past medical history, past social history, past surgical history, and problem list.    Objective   Vital Signs:   /68 (BP Location: Right arm, Patient Position: Sitting, Cuff Size: Adult)   Pulse 86   Temp 97.5 °F (36.4 °C) (Temporal)   Ht 154.9 cm (61\")   Wt 46.5 kg (102 lb 9.6 oz)   SpO2 97%   BMI 19.39 kg/m²     Wt Readings from Last 3 Encounters:   07/29/22 46.5 kg (102 lb 9.6 oz)   06/10/22 49 kg (108 lb)   06/08/22 49.9 kg (110 lb)     BP Readings from Last 3 Encounters:   07/29/22 102/68   06/10/22 117/77   06/08/22 110/60     Physical Exam  Vitals reviewed.   Constitutional:       General: She is not in acute distress.     Appearance: Normal appearance.   HENT:      Head: Normocephalic and atraumatic.   Eyes:      Conjunctiva/sclera: Conjunctivae normal.   Cardiovascular:      Rate and Rhythm: Normal rate and regular rhythm.      Pulses: Normal pulses.      Heart sounds: Normal heart sounds. No murmur heard.  Pulmonary:      Effort: Pulmonary effort is normal.      Breath sounds: Normal breath sounds.   Abdominal:      General: Abdomen is flat.      Palpations: Abdomen is soft. There is no mass.   Musculoskeletal:      Right lower leg: No edema.      Left lower leg: No edema.   Skin:     General: Skin is warm and dry.   Neurological:      General: No focal deficit present.      Mental Status: She is alert. Mental status is at baseline.   Psychiatric:         Mood and Affect: Mood normal.         Behavior: Behavior normal.         Thought Content: Thought content normal.         Judgment: Judgment normal.        Result Review :   The following data was reviewed by: Jonny Pitts MD on 07/29/2022:  Common labs    Common Labsle 10/18/21 10/18/21 1/30/22 1/30/22 5/28/22 5/28/22    1602 1602 2125 2125 1241 1241   Glucose  " 105 (A)  72  91   BUN  6  8  6   Creatinine  0.81  0.73  0.75   eGFR  Am  103  116     Sodium  141  138  136   Potassium  3.6  3.5  4.3   Chloride  102  103  101   Calcium  9.7  8.7  10.1   Albumin  5.00  4.00     Total Bilirubin  0.7  0.4     Alkaline Phosphatase  95  85     AST (SGOT)  36 (A)  18     ALT (SGPT)  29  13     WBC 13.50 (A)  9.25  10.78    Hemoglobin 15.4  12.7  14.7    Hematocrit 44.3  38.4  44.1    Platelets 353  286  317    (A) Abnormal value       Comments are available for some flowsheets but are not being displayed.                  Lab Results   Component Value Date    COVID19 Not Detected 07/19/2022    BILIRUBINUR Negative 06/10/2022       Procedures        Assessment and Plan    Diagnoses and all orders for this visit:    1. Other chest pain (Primary)    2. History of congenital heart disease    3. Palpitations    4. Cigar smoker    5. Migraine without status migrainosus, not intractable, unspecified migraine type      Chest pain, paliptation:  -history of congenital heart disease (thought to be VSD repair)  -echo and stress test in the near future  -no interim episodes of chest pain or palpitations      Tobacco cessation:  -counseled today on the health risks of tobacco use  -strongly counseled today on the importance of tobacco cessation  -counseling today on medical and non-medical strategies for smoking cessation  -non-medical strategies discussed today include the following: identifying your motivations for smoking cessation, setting a quit date, identifying your usual patterns and triggers for smoking cessation, coming up with strategies ahead of time for managing your usual smoking times and usual triggers (such as making it as inconvenient as possible to complete the act, coming up with short substitute activities to engage in in lieu of tobacco use)    Migraine with auras:  -counseled to NOT take estrogen containing oral contraceptives    Medications Discontinued During This  Encounter   Medication Reason   • norethindrone-ethinyl estradiol (Loestrin 1/20, 21,) 1-20 MG-MCG per tablet           Follow Up   Return in about 3 months (around 10/29/2022) for chest pain, tobacco use.  Patient was given instructions and counseling regarding her condition or for health maintenance advice. Please see specific information pulled into the AVS if appropriate.       Jonny Pitts MD  07/29/22  12:09 EDT

## 2022-08-12 DIAGNOSIS — Z72.0 CURRENTLY ATTEMPTING TO QUIT SMOKING: Primary | ICD-10-CM

## 2022-08-12 RX ORDER — NICOTINE 21 MG/24HR
1 PATCH, TRANSDERMAL 24 HOURS TRANSDERMAL EVERY 24 HOURS
Qty: 28 PATCH | Refills: 0 | Status: SHIPPED | OUTPATIENT
Start: 2022-08-12 | End: 2022-08-15 | Stop reason: SDUPTHER

## 2022-08-12 NOTE — TELEPHONE ENCOUNTER
Patient is requesting refill of nicotine patch.  Please advise.  You last saw her 06/10/22, and she has no upcoming appointments.

## 2022-08-15 DIAGNOSIS — Z72.0 CURRENTLY ATTEMPTING TO QUIT SMOKING: ICD-10-CM

## 2022-08-15 RX ORDER — PANTOPRAZOLE SODIUM 20 MG/1
TABLET, DELAYED RELEASE ORAL
Qty: 30 TABLET | Refills: 3 | Status: SHIPPED | OUTPATIENT
Start: 2022-08-15

## 2022-08-15 RX ORDER — NICOTINE 21 MG/24HR
1 PATCH, TRANSDERMAL 24 HOURS TRANSDERMAL EVERY 24 HOURS
Qty: 28 PATCH | Refills: 1 | Status: SHIPPED | OUTPATIENT
Start: 2022-08-15

## 2022-10-27 ENCOUNTER — OFFICE VISIT (OUTPATIENT)
Dept: INTERNAL MEDICINE | Facility: CLINIC | Age: 27
End: 2022-10-27

## 2022-10-27 VITALS
BODY MASS INDEX: 20.47 KG/M2 | WEIGHT: 108.4 LBS | OXYGEN SATURATION: 99 % | DIASTOLIC BLOOD PRESSURE: 69 MMHG | SYSTOLIC BLOOD PRESSURE: 103 MMHG | TEMPERATURE: 98.4 F | HEIGHT: 61 IN | HEART RATE: 90 BPM

## 2022-10-27 DIAGNOSIS — M25.511 CHRONIC PAIN OF BOTH SHOULDERS: Primary | ICD-10-CM

## 2022-10-27 DIAGNOSIS — Z23 NEED FOR INFLUENZA VACCINATION: ICD-10-CM

## 2022-10-27 DIAGNOSIS — G89.29 CHRONIC PAIN OF BOTH SHOULDERS: Primary | ICD-10-CM

## 2022-10-27 DIAGNOSIS — M25.512 CHRONIC PAIN OF BOTH SHOULDERS: Primary | ICD-10-CM

## 2022-10-27 PROCEDURE — 90686 IIV4 VACC NO PRSV 0.5 ML IM: CPT | Performed by: INTERNAL MEDICINE

## 2022-10-27 PROCEDURE — 90471 IMMUNIZATION ADMIN: CPT | Performed by: INTERNAL MEDICINE

## 2022-10-27 PROCEDURE — 99213 OFFICE O/P EST LOW 20 MIN: CPT | Performed by: INTERNAL MEDICINE

## 2022-10-27 RX ORDER — ALBUTEROL SULFATE 2.5 MG/3ML
SOLUTION RESPIRATORY (INHALATION)
Qty: 180 ML | Refills: 2 | Status: SHIPPED | OUTPATIENT
Start: 2022-10-27 | End: 2023-01-09

## 2023-01-02 ENCOUNTER — APPOINTMENT (OUTPATIENT)
Dept: GENERAL RADIOLOGY | Facility: HOSPITAL | Age: 28
End: 2023-01-02
Payer: COMMERCIAL

## 2023-01-02 ENCOUNTER — HOSPITAL ENCOUNTER (EMERGENCY)
Facility: HOSPITAL | Age: 28
Discharge: HOME | End: 2023-01-02
Attending: EMERGENCY MEDICINE | Admitting: EMERGENCY MEDICINE
Payer: COMMERCIAL

## 2023-01-02 VITALS
WEIGHT: 101.85 LBS | TEMPERATURE: 98.2 F | RESPIRATION RATE: 18 BRPM | HEIGHT: 61 IN | DIASTOLIC BLOOD PRESSURE: 61 MMHG | HEART RATE: 86 BPM | SYSTOLIC BLOOD PRESSURE: 107 MMHG | BODY MASS INDEX: 19.23 KG/M2 | OXYGEN SATURATION: 100 %

## 2023-01-02 DIAGNOSIS — M25.511 ACUTE PAIN OF RIGHT SHOULDER: Primary | ICD-10-CM

## 2023-01-02 PROCEDURE — 96372 THER/PROPH/DIAG INJ SC/IM: CPT

## 2023-01-02 PROCEDURE — 25010000002 DEXAMETHASONE PER 1 MG: Performed by: NURSE PRACTITIONER

## 2023-01-02 PROCEDURE — 73030 X-RAY EXAM OF SHOULDER: CPT

## 2023-01-02 PROCEDURE — 99283 EMERGENCY DEPT VISIT LOW MDM: CPT

## 2023-01-02 PROCEDURE — 25010000002 KETOROLAC TROMETHAMINE PER 15 MG: Performed by: NURSE PRACTITIONER

## 2023-01-02 RX ORDER — KETOROLAC TROMETHAMINE 10 MG/1
10 TABLET, FILM COATED ORAL EVERY 6 HOURS PRN
Qty: 15 TABLET | Refills: 0 | Status: SHIPPED | OUTPATIENT
Start: 2023-01-02

## 2023-01-02 RX ORDER — DEXAMETHASONE SODIUM PHOSPHATE 10 MG/ML
10 INJECTION INTRAMUSCULAR; INTRAVENOUS ONCE
Status: COMPLETED | OUTPATIENT
Start: 2023-01-02 | End: 2023-01-02

## 2023-01-02 RX ORDER — KETOROLAC TROMETHAMINE 30 MG/ML
30 INJECTION, SOLUTION INTRAMUSCULAR; INTRAVENOUS ONCE
Status: COMPLETED | OUTPATIENT
Start: 2023-01-02 | End: 2023-01-02

## 2023-01-02 RX ADMIN — DEXAMETHASONE SODIUM PHOSPHATE 10 MG: 10 INJECTION INTRAMUSCULAR; INTRAVENOUS at 11:58

## 2023-01-02 RX ADMIN — KETOROLAC TROMETHAMINE 30 MG: 30 INJECTION, SOLUTION INTRAMUSCULAR; INTRAVENOUS at 11:55

## 2023-01-02 NOTE — Clinical Note
[facility] EMERGENCY ROOM  [address]  [place] [address]  Phone: [phone]    Sally Manuel was seen and treated in our emergency department on 1/2/2023.  She may return to work on 01/03/2023.         Thank you for choosing [facility].    Manuelito Steward, APRN

## 2023-01-02 NOTE — Clinical Note
[facility] EMERGENCY ROOM  [address]  [place] [address]  Phone: [phone]    Sally Manuel was seen and treated in our emergency department on 1/2/2023.  She may return to work on 01/04/2023.         Thank you for choosing [facility].    Manuelito Steward, APRN

## 2023-01-02 NOTE — ED PROVIDER NOTES
Time: 11:44 AM EST  Date of encounter:  1/2/2023  Independent Historian/Clinical History and Information was obtained by:   Patient  Chief Complaint: R shoulder pain    History is limited by: N/A    History of Present Illness:  Patient is a 27 y.o. year old female who presents to the emergency department for evaluation of R shoulder pain for a few years, worsened in the last month. She denies any recent injury. Pt has pain in her neck and pain in her shoulder when lifting elbow. She is not able to use her arm as much as she used to. Pt has not seen ortho for this issue. She denies issues with her kidneys. Pt is taking muscle relaxer and applying ice with little relief.      History provided by:  Patient   used: No        Patient Care Team  Primary Care Provider: Micheal Ochoa Jr., MD    Past Medical History:     No Known Allergies  Past Medical History:   Diagnosis Date   • Abnormal Pap smear of cervix    • Allergies    • Anxiety    • Arthritis    • Asthma    • Chlamydia    • Congenital heart disease    • Deafness    • Depression    • Epilepsy (HCC)    • GERD (gastroesophageal reflux disease)    • Head injury    • Heart murmur    • Herpes    • History of psychiatric care    • HPV (human papilloma virus) infection    • Migraines    • Miscarriage    • Ringing in ears    • Seizures (HCC)    • Shortness of breath    • STD exposure    • VSD (ventricular septal defect)      Past Surgical History:   Procedure Laterality Date   • BACK SURGERY  2015   • CARDIAC SURGERY     • ENDOSCOPY  2017   • KNEE SURGERY  01/2016   • OTHER SURGICAL HISTORY      METAL IMPLANTS   • TRACHEOSTOMY     • VSD REPAIR       Family History   Problem Relation Age of Onset   • Heart disease Father    • Diabetes Father    • Stroke Mother    • Diabetes Mother    • Heart attack Maternal Grandmother    • Heart attack Maternal Grandfather    • Breast cancer Other        Home Medications:  Prior to Admission medications     Medication Sig Start Date End Date Taking? Authorizing Provider   albuterol (PROVENTIL) (2.5 MG/3ML) 0.083% nebulizer solution USE ONE vial IN NEBULIZER EVERY FOUR HOURS AS NEEDED for wheezing 10/27/22   Micheal Ochoa Jr., MD   cyclobenzaprine (FLEXERIL) 10 MG tablet Take 1 tablet by mouth 3 Times a Day As Needed for Muscle Spasms. 5/3/22   Micheal Ochoa Jr., MD   Diclofenac Sodium 1 % cream Apply 4 g topically to the appropriate area as directed. 1/25/22   Emergency, Nurse Epic, RN   ibuprofen (ADVIL,MOTRIN) 400 MG tablet Take 1 tablet by mouth Every 8 (Eight) Hours As Needed for Moderate Pain . 4/14/22   Micheal Ochoa Jr., MD   lidocaine (LIDODERM) 5 % Place 1 patch on the skin as directed by provider Daily. Remove & Discard patch within 12 hours or as directed by MD 4/14/22   Micheal Ochoa Jr., MD   montelukast (SINGULAIR) 10 MG tablet Take 10 mg by mouth.    Provider, MD Jessica   nicotine (NICODERM CQ) 14 MG/24HR patch Place 1 patch on the skin as directed by provider Daily. 8/15/22   Saskia Corbin APRN   nicotine (NICODERM CQ) 7 MG/24HR patch Place 1 patch on the skin as directed by provider Daily. Taper, start with 14mg patch 6/10/22   Saskia Corbin APRN   ondansetron ODT (ZOFRAN-ODT) 4 MG disintegrating tablet Place 1 tablet on the tongue Every 8 (Eight) Hours As Needed for Nausea or Vomiting. 10/18/21   Jhonny Slaughter,    pantoprazole (PROTONIX) 20 MG EC tablet TAKE 1 TABLET BY MOUTH ONCE daily 8/15/22   Breann Arce APRN   rizatriptan (MAXALT) 10 MG tablet rizatriptan 10 mg oral tablet take 1 tablet (10 mg) by oral route once, may repeat at 2 hour intervals; do not exceed 30 mg in 24 hours for 30 days   Suspended    Provider, MD Jessica   sertraline (ZOLOFT) 25 MG tablet TAKE 1 TABLET BY MOUTH EVERY DAY 1/25/22   Micheal Ochoa Jr., MD   topiramate (TOPAMAX) 50 MG tablet Take 1 tablet by mouth Daily. 6/6/22   Micheal Ochoa  Milton Ramirez MD        Social History:   Social History     Tobacco Use   • Smoking status: Every Day     Packs/day: 1.00     Years: 4.00     Pack years: 4.00     Types: Cigars, Cigarettes   • Smokeless tobacco: Current   Vaping Use   • Vaping Use: Never used   Substance Use Topics   • Alcohol use: Yes     Comment: CURRENT SOME DAY , OCCASIONALLY    • Drug use: Yes     Types: Marijuana         Review of Systems:  Review of Systems   Constitutional: Negative for chills and fever.   HENT: Negative for congestion, ear pain and sore throat.    Eyes: Negative for pain.   Respiratory: Negative for cough, chest tightness and shortness of breath.    Cardiovascular: Negative for chest pain.   Gastrointestinal: Negative for abdominal pain, diarrhea, nausea and vomiting.   Genitourinary: Negative for flank pain and hematuria.   Musculoskeletal: Positive for arthralgias (R shoulder). Negative for joint swelling.   Skin: Negative for pallor.   Neurological: Negative for seizures and headaches.   All other systems reviewed and are negative.       Physical Exam:  /61 (BP Location: Left arm, Patient Position: Sitting)   Pulse 86   Temp 98.2 °F (36.8 °C) (Oral)   Resp 18   Ht 154.9 cm (61\")   Wt 46.2 kg (101 lb 13.6 oz)   SpO2 100%   BMI 19.24 kg/m²     Physical Exam  Vitals and nursing note reviewed.   Constitutional:       General: She is not in acute distress.     Appearance: Normal appearance. She is not toxic-appearing.   HENT:      Head: Normocephalic and atraumatic.      Mouth/Throat:      Mouth: Mucous membranes are moist.   Eyes:      General: No scleral icterus.  Cardiovascular:      Rate and Rhythm: Normal rate and regular rhythm.      Pulses: Normal pulses.      Heart sounds: Normal heart sounds.   Pulmonary:      Effort: Pulmonary effort is normal. No respiratory distress.      Breath sounds: Normal breath sounds.   Abdominal:      General: Abdomen is flat.      Palpations: Abdomen is soft.      Tenderness:  There is no abdominal tenderness.   Musculoskeletal:      Right shoulder: No swelling, deformity or tenderness. Decreased range of motion.      Cervical back: Normal range of motion and neck supple.   Skin:     General: Skin is warm and dry.   Neurological:      Mental Status: She is alert and oriented to person, place, and time. Mental status is at baseline.                  Procedures:  Procedures      Medical Decision Making:      Comorbidities that affect care:    None    External Notes reviewed:    None      The following orders were placed and all results were independently analyzed by me:  Orders Placed This Encounter   Procedures   • [facility] 02.  Shoulder Immobilizer/Sling   • XR Shoulder 2+ View Right   • Obtain & Apply The Following- Upper extremity; Sling       Medications Given in the Emergency Department:  Medications   ketorolac (TORADOL) injection 30 mg (30 mg Intramuscular Given 1/2/23 1155)   dexamethasone (DECADRON) injection 10 mg (10 mg Intramuscular Given 1/2/23 1158)        ED Course:         Labs:    Lab Results (last 24 hours)     ** No results found for the last 24 hours. **           Imaging:    XR Shoulder 2+ View Right    Result Date: 1/2/2023  PROCEDURE: XR SHOULDER 2+ VW RIGHT  COMPARISON: None  INDICATIONS: RIGHT SHOULDER PAIN OFF & ON X 2-3 YEARS - NO INJURY  FINDINGS:  No fractures are visualized.  There are no findings of dislocation.  No degenerative spurring is evident.  No abnormality is seen at the right lung apex.  Spinal rods are evident.       Negative right shoulder series.      CANDIDO CASTILLO MD       Electronically Signed and Approved By: CANDIDO CASTILLO MD on 1/02/2023 at 11:23                 Differential Diagnosis and Discussion:    Extremity Pain: Differential diagnosis includes but is not limited to soft tissue sprain, tendonitis, tendon injury, dislocation, fracture, deep vein thrombosis, arterial insufficiency, osteoarthritis, bursitis, and ligamentous  damage.        MDM  Number of Diagnoses or Management Options  Acute pain of right shoulder: established and worsening  Diagnosis management comments: I have explained the patient´s condition, diagnoses and treatment plan based on the information available to me at this time. I have answered questions and addressed any concerns. The patient has a good  understanding of the patient´s diagnosis, condition, and treatment plan as can be expected at this point. The vital signs have been stable. The patient´s condition is stable and appropriate for discharge from the emergency department.      The patient will pursue further outpatient evaluation with the primary care physician or other designated or consulting physician as outlined in the discharge instructions. They are agreeable to this plan of care and follow-up instructions have been explained in detail. The patient has received these instructions in written format and have expressed an understanding of the discharge instructions. The patient is aware that any significant change in condition or worsening of symptoms should prompt an immediate return to this or the closest emergency department or call to 911.       Amount and/or Complexity of Data Reviewed  Tests in the radiology section of CPT®: reviewed    Risk of Complications, Morbidity, and/or Mortality  Presenting problems: low  Diagnostic procedures: low  Management options: low    Patient Progress  Patient progress: stable           Patient Care Considerations:    None      Consultants/Shared Management Plan:    None    Social Determinants of Health:    Patient is independent, reliable, and has access to care.       Disposition and Care Coordination:    Discharged: The patient is suitable and stable for discharge with no need for consideration of observation or admission.    I have explained discharge medications and the need for follow up with the patient/caretakers. This was also printed in the discharge  instructions. Patient was discharged with the following medications and follow up:      Medication List      New Prescriptions    ketorolac 10 MG tablet  Commonly known as: TORADOL  Take 1 tablet by mouth Every 6 (Six) Hours As Needed for Moderate Pain.           Where to Get Your Medications      These medications were sent to [facility] - [place], [place] - [address] - [phone]  - [phone] FX  [address] [place] [address]    Phone: [phone]   · ketorolac 10 MG tablet      Micheal Ochoa Jr., MD  [address]  [address]  [place] [id number]  [phone]      As needed    Jhonny Hansen MD  [address] [facility] [address]  [phone]    Schedule an appointment as soon as possible for a visit          Final diagnoses:   Acute pain of right shoulder        ED Disposition     ED Disposition   Discharge    Condition   --    Comment   --             This medical record created using voice recognition software.    Documentation assistance provided by Mahamed Cornell acting as scribe for [employer]. Information recorded by the scribe was done at my direction and has been verified and validated by me.        Mahamed Cornell  01/02/23 1149       Manuelito Steward APRN  01/02/23 2010

## 2023-01-09 ENCOUNTER — OFFICE VISIT (OUTPATIENT)
Dept: INTERNAL MEDICINE | Facility: CLINIC | Age: 28
End: 2023-01-09
Payer: COMMERCIAL

## 2023-01-09 VITALS
HEART RATE: 98 BPM | HEIGHT: 61 IN | WEIGHT: 106.4 LBS | SYSTOLIC BLOOD PRESSURE: 123 MMHG | DIASTOLIC BLOOD PRESSURE: 82 MMHG | OXYGEN SATURATION: 98 % | TEMPERATURE: 97.8 F | BODY MASS INDEX: 20.09 KG/M2

## 2023-01-09 DIAGNOSIS — M25.512 CHRONIC PAIN OF BOTH SHOULDERS: ICD-10-CM

## 2023-01-09 DIAGNOSIS — F17.290 CIGAR SMOKER: ICD-10-CM

## 2023-01-09 DIAGNOSIS — M25.511 ACUTE PAIN OF RIGHT SHOULDER: Primary | ICD-10-CM

## 2023-01-09 DIAGNOSIS — G89.29 CHRONIC PAIN OF BOTH SHOULDERS: ICD-10-CM

## 2023-01-09 DIAGNOSIS — M75.81 TENDINITIS OF RIGHT ROTATOR CUFF: ICD-10-CM

## 2023-01-09 DIAGNOSIS — M25.511 CHRONIC PAIN OF BOTH SHOULDERS: ICD-10-CM

## 2023-01-09 PROCEDURE — 99214 OFFICE O/P EST MOD 30 MIN: CPT | Performed by: STUDENT IN AN ORGANIZED HEALTH CARE EDUCATION/TRAINING PROGRAM

## 2023-01-09 RX ORDER — ALBUTEROL SULFATE 2.5 MG/3ML
SOLUTION RESPIRATORY (INHALATION)
Qty: 180 ML | Refills: 2 | Status: SHIPPED | OUTPATIENT
Start: 2023-01-09 | End: 2023-03-15

## 2023-01-09 NOTE — PROGRESS NOTES
Chief Complaint  Follow-up (Er follow up right shoulder pain/ joint pain)    Subjective          Sally Manuel presents to [facility]  History of Present Illness    27 year old female with a long-standing history of bilateral shoulder pain.  Seen in ER on 1/2/2023 for right sided shoulder pain.  Had reassuring evaluation and sent home with prescription for ketorolac.  Pain is still persisting in right shoulder.    Of note, still smoking.  Previously prescribed nicotine patches, but hasn't started yet.    Current Outpatient Medications   Medication Instructions   • albuterol (PROVENTIL) (2.5 MG/3ML) 0.083% nebulizer solution USE ONE vial IN NEBULIZER EVERY FOUR HOURS AS NEEDED for wheezing   • cyclobenzaprine (FLEXERIL) 10 MG tablet Take 1 tablet by mouth 3 Times a Day As Needed for Muscle Spasms.   • Diclofenac Sodium 4 g, Topical   • ibuprofen (ADVIL,MOTRIN) 400 mg, Oral, Every 8 Hours PRN   • ketorolac (TORADOL) 10 mg, Oral, Every 6 Hours PRN   • lidocaine (LIDODERM) 5 % 1 patch, Transdermal, Every 24 Hours, Remove & Discard patch within 12 hours or as directed by MD   • montelukast (SINGULAIR) 10 mg, Oral   • nicotine (NICODERM CQ) 14 MG/24HR patch 1 patch, Transdermal, Every 24 Hours   • nicotine (NICODERM CQ) 7 MG/24HR patch 1 patch, Transdermal, Every 24 Hours, Taper, start with 14mg patch   • ondansetron ODT (ZOFRAN-ODT) 4 mg, Translingual, Every 8 Hours PRN   • pantoprazole (PROTONIX) 20 MG EC tablet TAKE 1 TABLET BY MOUTH ONCE daily   • rizatriptan (MAXALT) 10 MG tablet rizatriptan 10 mg oral tablet take 1 tablet (10 mg) by oral route once, may repeat at 2 hour intervals; do not exceed 30 mg in 24 hours for 30 days   Suspended   • sertraline (ZOLOFT) 25 MG tablet TAKE 1 TABLET BY MOUTH EVERY DAY   • topiramate (TOPAMAX) 50 mg, Oral, Daily       The following portions of the patient's history were reviewed and updated as appropriate: allergies, current  medications, past family history, past medical history, past social history, past surgical history, and problem list.    Objective   Vital Signs:   /82   Pulse 98   Temp 97.8 °F (36.6 °C) (Temporal)   Ht 154.9 cm (61\")   Wt 48.3 kg (106 lb 6.4 oz)   SpO2 98%   BMI 20.10 kg/m²     Wt Readings from Last 3 Encounters:   01/09/23 48.3 kg (106 lb 6.4 oz)   01/02/23 46.2 kg (101 lb 13.6 oz)   10/27/22 49.2 kg (108 lb 6.4 oz)     BP Readings from Last 3 Encounters:   01/09/23 123/82   01/02/23 107/61   10/27/22 103/69     Physical Exam  Vitals reviewed.   Constitutional:       General: She is not in acute distress.     Appearance: Normal appearance. She is not ill-appearing, toxic-appearing or diaphoretic.   HENT:      Head: Normocephalic and atraumatic.      Right Ear: External ear normal.      Left Ear: External ear normal.   Eyes:      Conjunctiva/sclera: Conjunctivae normal.   Cardiovascular:      Rate and Rhythm: Normal rate and regular rhythm.      Pulses: Normal pulses.      Heart sounds: Normal heart sounds. No murmur heard.    No friction rub. No gallop.   Pulmonary:      Effort: Pulmonary effort is normal. No respiratory distress.      Breath sounds: Normal breath sounds. No stridor. No wheezing, rhonchi or rales.   Chest:      Chest wall: No tenderness.   Abdominal:      General: Abdomen is flat.      Palpations: Abdomen is soft. There is no mass.      Tenderness: There is no abdominal tenderness.   Musculoskeletal:      Right lower leg: No edema.      Left lower leg: No edema.      Comments: Right arm pain with resisted internal rotation and empty can test.    5/5 deltoid strength bilaterally.  No pain with resisted bilateral external rotation.  No pain with left sided resisted internal rotation.  No pain with bilateral passive adduction.  No pain with left sided empty can test.   Skin:     General: Skin is warm and dry.   Neurological:      General: No focal deficit present.      Mental Status: She  is alert. Mental status is at baseline.   Psychiatric:         Mood and Affect: Mood normal.         Behavior: Behavior normal.         Thought Content: Thought content normal.         Judgment: Judgment normal.       Result Review :   The following data was reviewed by: Jonny Pitts MD on 01/09/2023:  Common labs    Common Labs 1/30/22 1/30/22 5/28/22 5/28/22    2125 2125 1241 1241   Glucose  72  91   BUN  8  6   Creatinine  0.73  0.75   eGFR African Am  116     Sodium  138  136   Potassium  3.5  4.3   Chloride  103  101   Calcium  8.7  10.1   Albumin  4.00     Total Bilirubin  0.4     Alkaline Phosphatase  85     AST (SGOT)  18     ALT (SGPT)  13     WBC 9.25  10.78    Hemoglobin 12.7  14.7    Hematocrit 38.4  44.1    Platelets 286  317                 1/2/23 ED imaging and documentation reviewed.      Lab Results   Component Value Date    COVID19 Not Detected 07/19/2022    BILIRUBINUR Negative 06/10/2022       Procedures        Assessment and Plan    Diagnoses and all orders for this visit:    1. Acute pain of right shoulder (Primary)    2. Chronic pain of both shoulders  -     Ambulatory Referral to Orthopedic Surgery  -     Ambulatory Referral to Physical Therapy Evaluate and treat    3. Tendinitis of right rotator cuff  -     Ambulatory Referral to Physical Therapy Evaluate and treat    4. Cigar smoker      Cigar smoker:  -counseled on health consequences of tobacco use, and strongly counseled on importance of smoking cessation  -counseling today on medical and non-medical strategies for smoking cessation  -non-medical strategies discussed today include the following: identifying your motivations for smoking cessation, setting a quit date, identifying your usual patterns and triggers for smoking, coming up with strategies ahead of time for managing your usual smoking times and usual triggers (such as making it as inconvenient as possible to complete the act, coming up with short substitute activities to  engage in in lieu of tobacco use)        There are no discontinued medications.       Follow Up   No follow-ups on file.  Patient was given instructions and counseling regarding her condition or for health maintenance advice. Please see specific information pulled into the AVS if appropriate.       Jonny Pitts MD  01/09/23  17:28 EST

## 2023-01-10 ENCOUNTER — TELEPHONE (OUTPATIENT)
Dept: OBSTETRICS AND GYNECOLOGY | Facility: CLINIC | Age: 28
End: 2023-01-10
Payer: COMMERCIAL

## 2023-01-10 NOTE — TELEPHONE ENCOUNTER
Received fax this am from [facility] request Rx for Toradol.  They state orginally prescribed by Aldair Steward (ER).  Patient received Rx on 1-2-23 Toradol #15 with no refills. Last seen 6-10-22.  No appointment scheduled. Last thing you filled  For her 1-9-23 Albuterol (per Epic)

## 2023-01-19 ENCOUNTER — OFFICE VISIT (OUTPATIENT)
Dept: ORTHOPEDIC SURGERY | Facility: CLINIC | Age: 28
End: 2023-01-19
Payer: COMMERCIAL

## 2023-01-19 VITALS — BODY MASS INDEX: 20.01 KG/M2 | WEIGHT: 106 LBS | HEIGHT: 61 IN

## 2023-01-19 DIAGNOSIS — M75.42 IMPINGEMENT SYNDROME OF LEFT SHOULDER: ICD-10-CM

## 2023-01-19 DIAGNOSIS — M25.512 LEFT SHOULDER PAIN, UNSPECIFIED CHRONICITY: Primary | ICD-10-CM

## 2023-01-19 DIAGNOSIS — M75.41 IMPINGEMENT SYNDROME OF RIGHT SHOULDER: ICD-10-CM

## 2023-01-19 PROCEDURE — 99213 OFFICE O/P EST LOW 20 MIN: CPT | Performed by: ORTHOPAEDIC SURGERY

## 2023-01-19 PROCEDURE — 20610 DRAIN/INJ JOINT/BURSA W/O US: CPT | Performed by: ORTHOPAEDIC SURGERY

## 2023-01-19 RX ADMIN — LIDOCAINE HYDROCHLORIDE 5 ML: 10 INJECTION, SOLUTION INFILTRATION; PERINEURAL at 15:18

## 2023-01-19 RX ADMIN — TRIAMCINOLONE ACETONIDE 40 MG: 40 INJECTION, SUSPENSION INTRA-ARTICULAR; INTRAMUSCULAR at 15:18

## 2023-01-19 NOTE — PROGRESS NOTES
"Chief Complaint  Initial Evaluation of the Left Shoulder and Initial Evaluation of the Right Shoulder     Subjective      Sally Manuel presents to [facility] for initial evaluation of bilateral shoulder pain.  She has had pain in both shoulders for a couple of years.  She had a left steroid injection then and has limited problems with it since then.  Her right shoulder is having increase pain the last few months especially with overhead lifting.  She has had no recent injury.     No Known Allergies     Social History     Socioeconomic History   • Marital status: Single   Tobacco Use   • Smoking status: Every Day     Packs/day: 1.00     Years: 4.00     Pack years: 4.00     Types: Cigars, Cigarettes   • Smokeless tobacco: Never   Vaping Use   • Vaping Use: Never used   Substance and Sexual Activity   • Alcohol use: Yes     Comment: CURRENT SOME DAY , OCCASIONALLY    • Drug use: Yes     Types: Marijuana   • Sexual activity: Yes     Partners: Male     Birth control/protection: None        Review of Systems     Objective   Vital Signs:   Ht 154.9 cm (61\")   Wt 48.1 kg (106 lb)   BMI 20.03 kg/m²       Physical Exam  Constitutional:       Appearance: Normal appearance. Patient is well-developed and normal weight.   HENT:      Head: Normocephalic.      Right Ear: Hearing and external ear normal.      Left Ear: Hearing and external ear normal.      Nose: Nose normal.   Eyes:      Conjunctiva/sclera: Conjunctivae normal.   Cardiovascular:      Rate and Rhythm: Normal rate.   Pulmonary:      Effort: Pulmonary effort is normal.      Breath sounds: No wheezing or rales.   Abdominal:      Palpations: Abdomen is soft.      Tenderness: There is no abdominal tenderness.   Musculoskeletal:      Cervical back: Normal range of motion.   Skin:     Findings: No rash.   Neurological:      Mental Status: Patient is alert and oriented to person, place, and time.   Psychiatric:         Mood and Affect: Mood " and affect normal.         Judgment: Judgment normal.       Ortho Exam      BILATERAL SHOULDERS Forward flexion 130. Abduction 90. External rotation 70. Internal rotation to T12. Positive Cross body adduction. Supraspinatus strength 3+/5. Infraspinatus Strength 3+/5. Infrared subscap 3+/5. Positive Sales. Positive Neer. Negative Apprehension. Negative Lift off. (Negative Obriens. Sensation intact to light touch, median, radial, ulnar nerve. Positive AIN, PIN, ulnar nerve motor. Positive pulses. Positive Impingement signs. Good strength in triceps, biceps, deltoid, wrist extensors and wrist flexors.        Large Joint Arthrocentesis: R subacromial bursa  Date/Time: 1/19/2023 3:18 PM  Consent given by: patient  Site marked: site marked  Timeout: Immediately prior to procedure a time out was called to verify the correct patient, procedure, equipment, support staff and site/side marked as required   Supporting Documentation  Indications: pain   Procedure Details  Location: shoulder - R subacromial bursa  Preparation: Patient was prepped and draped in the usual sterile fashion  Needle gauge: 21g.  Medications administered: 40 mg triamcinolone acetonide 40 MG/ML; 5 mL lidocaine 1 %  Patient tolerance: patient tolerated the procedure well with no immediate complications            Imaging Results (Most Recent)     Procedure Component Value Units Date/Time    XR Scapula Left [[id number]] Resulted: 01/19/23 1456     Updated: 01/19/23 1459           Result Review :     X-Ray Report:  Left scapula X-Ray  Indication: Evaluation of the left scapula  AP/Lateral view(s)  Findings: No acute osseous abnormality, no dislocation or fracture.   Prior studies available for comparison:No         XR Shoulder 2+ View Right    Result Date: 1/2/2023  Narrative: PROCEDURE: XR SHOULDER 2+ VW RIGHT  COMPARISON: None  INDICATIONS: RIGHT SHOULDER PAIN OFF & ON X 2-3 YEARS - NO INJURY  FINDINGS:  No fractures are visualized.  There are no  findings of dislocation.  No degenerative spurring is evident.  No abnormality is seen at the right lung apex.  Spinal rods are evident.      Impression:  Negative right shoulder series.      CANDIDO CASTILLO MD       Electronically Signed and Approved By: CANDIDO CASTILLO MD on 1/02/2023 at 11:23                      Assessment and Plan     Diagnoses and all orders for this visit:    1. Left shoulder pain, unspecified chronicity (Primary)  -     XR Scapula Left  -     Ambulatory Referral to Physical Therapy Evaluate and treat (2-3 times a week for 4-6 weeks), Ortho    2. Impingement syndrome of left shoulder  -     Ambulatory Referral to Physical Therapy Evaluate and treat (2-3 times a week for 4-6 weeks), Ortho  -     Ambulatory Referral to Physical Therapy Evaluate and treat, Ortho    3. Impingement syndrome of right shoulder  -     Ambulatory Referral to Physical Therapy Evaluate and treat (2-3 times a week for 4-6 weeks), Ortho  -     Large Joint Arthrocentesis: R subacromial bursa  -     Ambulatory Referral to Physical Therapy Evaluate and treat, Ortho      I reviewed the X-rays that were obtained today with the patient. Discussed the treatment plan with the patient. Discussed the risks and benefits of conservative measures as injections, anti inflammatories, and exercises. She was prescribed physical therapy.  The patient expressed understanding and wished to proceed with a right steroid injection. She tolerated a the injection well.     Educated on risk of smoking. Discussed options for smoking cessation. and Call or return if worsening symptoms.    Follow Up     PRN      Patient was given instructions and counseling regarding her condition or for health maintenance advice. Please see specific information pulled into the AVS if appropriate.     Scribed for Jasmina Crane MD by Claudia Ramirez MA.  01/19/23   15:09 EST    I have personally performed the services described in this document as scribed by the  above individual and it is both accurate and complete. Jasmina Crane MD 01/23/23

## 2023-01-23 RX ORDER — LIDOCAINE HYDROCHLORIDE 10 MG/ML
5 INJECTION, SOLUTION INFILTRATION; PERINEURAL
Status: COMPLETED | OUTPATIENT
Start: 2023-01-19 | End: 2023-01-19

## 2023-01-23 RX ORDER — TRIAMCINOLONE ACETONIDE 40 MG/ML
40 INJECTION, SUSPENSION INTRA-ARTICULAR; INTRAMUSCULAR
Status: COMPLETED | OUTPATIENT
Start: 2023-01-19 | End: 2023-01-19

## 2023-01-25 ENCOUNTER — TELEPHONE (OUTPATIENT)
Dept: OBSTETRICS AND GYNECOLOGY | Facility: CLINIC | Age: 28
End: 2023-01-25

## 2023-01-25 NOTE — TELEPHONE ENCOUNTER
Called patient left message.  Patient is scheduled for gyn if patient is pregnant need to reschedule  to a new ob

## 2023-01-26 ENCOUNTER — OFFICE VISIT (OUTPATIENT)
Dept: OBSTETRICS AND GYNECOLOGY | Facility: CLINIC | Age: 28
End: 2023-01-26
Payer: COMMERCIAL

## 2023-01-26 VITALS — WEIGHT: 101 LBS | BODY MASS INDEX: 19.08 KG/M2 | SYSTOLIC BLOOD PRESSURE: 120 MMHG | DIASTOLIC BLOOD PRESSURE: 72 MMHG

## 2023-01-26 DIAGNOSIS — Z32.02 PREGNANCY EXAMINATION OR TEST, NEGATIVE RESULT: ICD-10-CM

## 2023-01-26 DIAGNOSIS — Z91.89 RISK OF EXPOSURE TO COMMUNICABLE DISEASE: ICD-10-CM

## 2023-01-26 DIAGNOSIS — R10.2 PELVIC PAIN: Primary | ICD-10-CM

## 2023-01-26 LAB
B-HCG UR QL: NEGATIVE
EXPIRATION DATE: NORMAL
INTERNAL NEGATIVE CONTROL: NORMAL
INTERNAL POSITIVE CONTROL: NORMAL
Lab: NORMAL

## 2023-01-26 PROCEDURE — 87661 TRICHOMONAS VAGINALIS AMPLIF: CPT | Performed by: OBSTETRICS & GYNECOLOGY

## 2023-01-26 PROCEDURE — 87491 CHLMYD TRACH DNA AMP PROBE: CPT | Performed by: OBSTETRICS & GYNECOLOGY

## 2023-01-26 PROCEDURE — 99214 OFFICE O/P EST MOD 30 MIN: CPT | Performed by: OBSTETRICS & GYNECOLOGY

## 2023-01-26 PROCEDURE — 81025 URINE PREGNANCY TEST: CPT | Performed by: OBSTETRICS & GYNECOLOGY

## 2023-01-26 PROCEDURE — G0123 SCREEN CERV/VAG THIN LAYER: HCPCS | Performed by: OBSTETRICS & GYNECOLOGY

## 2023-01-26 PROCEDURE — 87591 N.GONORRHOEAE DNA AMP PROB: CPT | Performed by: OBSTETRICS & GYNECOLOGY

## 2023-01-26 NOTE — PROGRESS NOTES
GYN Problem/Follow Up Visit    Chief Complaint   Patient presents with   • Exposure to STD     STD Check, left sided pelvic pain with menses, discuss birth control options and patient desires UCG today           HPI  Sally Manuel is a 27 y.o. female, , who presents for for evaluation of pelvic pain  Patient complains of left-sided pelvic pain with cycles.  She also has potential exposure to STDs.  And desires testing.  History of LEEP .  ALEX-2 at the time.     Prior use of DepoProvera   May 2022 bled one month.  Menses are not regular.  May skip a month.    Additional OB/GYN History   Patient's last menstrual period was 2022.  Allergies : Patient has no known allergies.       Objective   /72   Wt 45.8 kg (101 lb)   LMP 2022 Comment: Started spotting today  BMI 19.08 kg/m²     Physical Exam  Alert and oriented x4  Abdomen soft.  No CVA tenderness.     Assessment and Plan    Diagnoses and all orders for this visit:    1. Pelvic pain (Primary)    2. Pregnancy examination or test, negative result  -     POC Pregnancy, Urine    3. Risk of exposure to communicable disease  -     IGP,CtNgTv,rfx Aptima HPV ASCU  -     Hepatitis B Surface Antigen  -     Hepatitis C Antibody  -     HIV-1 & HIV-2 Antibodies  -     T Pallidum Antibody (FTA-Ab)  -     PDF Report        Counseling:  • Plan follow-up for Nexplanon for contraception  • Ibuprofen for discomfort      Follow Up:  For Nexplanon placement        Gaetano Sanford Sr., MD  2023

## 2023-02-01 LAB
C TRACH RRNA CVX QL NAA+PROBE: NEGATIVE
CONV .: ABNORMAL
CYTOLOGIST CVX/VAG CYTO: ABNORMAL
CYTOLOGY CVX/VAG DOC CYTO: ABNORMAL
CYTOLOGY CVX/VAG DOC THIN PREP: ABNORMAL
DX ICD CODE: ABNORMAL
HIV 1 & 2 AB SER-IMP: ABNORMAL
N GONORRHOEA RRNA CVX QL NAA+PROBE: NEGATIVE
OTHER STN SPEC: ABNORMAL
STAT OF ADQ CVX/VAG CYTO-IMP: ABNORMAL
T VAGINALIS RRNA SPEC QL NAA+PROBE: POSITIVE

## 2023-02-02 DIAGNOSIS — A59.01 TRICHOMONAL VAGINITIS: Primary | ICD-10-CM

## 2023-02-02 RX ORDER — METRONIDAZOLE 500 MG/1
500 TABLET ORAL 2 TIMES DAILY
Qty: 14 TABLET | Refills: 1 | Status: SHIPPED | OUTPATIENT
Start: 2023-02-02

## 2023-02-03 ENCOUNTER — TELEPHONE (OUTPATIENT)
Dept: OBSTETRICS AND GYNECOLOGY | Facility: CLINIC | Age: 28
End: 2023-02-03
Payer: COMMERCIAL

## 2023-02-03 NOTE — TELEPHONE ENCOUNTER
----- Message from Gaetano Sanford Sr., MD sent at 2/2/2023  1:20 PM EST -----  Please call patient  Flagyl sent to pharmacy to treat trichomoniasis.  Partner also should seek treatment.

## 2023-02-13 ENCOUNTER — TELEPHONE (OUTPATIENT)
Dept: OBSTETRICS AND GYNECOLOGY | Facility: CLINIC | Age: 28
End: 2023-02-13
Payer: COMMERCIAL

## 2023-02-13 ENCOUNTER — OFFICE VISIT (OUTPATIENT)
Dept: INTERNAL MEDICINE | Facility: CLINIC | Age: 28
End: 2023-02-13
Payer: COMMERCIAL

## 2023-02-13 VITALS
SYSTOLIC BLOOD PRESSURE: 115 MMHG | TEMPERATURE: 98.1 F | DIASTOLIC BLOOD PRESSURE: 72 MMHG | OXYGEN SATURATION: 98 % | BODY MASS INDEX: 19.3 KG/M2 | HEART RATE: 81 BPM | HEIGHT: 61 IN | WEIGHT: 102.2 LBS

## 2023-02-13 DIAGNOSIS — M25.512 CHRONIC PAIN OF BOTH SHOULDERS: ICD-10-CM

## 2023-02-13 DIAGNOSIS — G89.29 CHRONIC PAIN OF BOTH SHOULDERS: ICD-10-CM

## 2023-02-13 DIAGNOSIS — G40.909 SEIZURE DISORDER: Primary | ICD-10-CM

## 2023-02-13 DIAGNOSIS — M25.511 CHRONIC PAIN OF BOTH SHOULDERS: ICD-10-CM

## 2023-02-13 DIAGNOSIS — F17.290 CIGAR SMOKER: ICD-10-CM

## 2023-02-13 DIAGNOSIS — A59.01 TRICHOMONAL VAGINITIS: ICD-10-CM

## 2023-02-13 PROCEDURE — 99214 OFFICE O/P EST MOD 30 MIN: CPT | Performed by: STUDENT IN AN ORGANIZED HEALTH CARE EDUCATION/TRAINING PROGRAM

## 2023-02-13 NOTE — PROGRESS NOTES
Chief Complaint  Follow-up (Referral to neurology) and Shoulder Pain    Subjective          Sally Manuel presents to [facility]  History of Present Illness    Here with boyfriend.    Desires new neurology referral, as unable to get 's license with a history of seizures.  Would like to see someone besides Dr. Vann.  Reports last definite seizure was in her sleep circa 2014 (a gran mal, as witnessed by mother).  She also says that she may have had one during the daytime at work in the summer of last year (tongue biting, no loss of consciousness).  She is compliant with her topamax.    Seen by ortho in the interim with arthrocentesis right shoulder joint.  Shoulder is a bit improved.  PT referral placed, but not started as of yet.    Discussed Ob/Gyn results without boyfriend in the room.  In private, Miss Manuel reports that she is taking flagyl for trichomonas infection, that she is not sexually active with boyfriend she has brought to clinic, and that she has not told her previous sex partner about STI.      Current Outpatient Medications   Medication Instructions   • albuterol (PROVENTIL) (2.5 MG/3ML) 0.083% nebulizer solution USE ONE vial IN NEBULIZER EVERY FOUR HOURS AS NEEDED for wheezing   • cyclobenzaprine (FLEXERIL) 10 MG tablet Take 1 tablet by mouth 3 Times a Day As Needed for Muscle Spasms.   • Diclofenac Sodium 4 g, Topical   • ibuprofen (ADVIL,MOTRIN) 400 mg, Oral, Every 8 Hours PRN   • ketorolac (TORADOL) 10 mg, Oral, Every 6 Hours PRN   • lidocaine (LIDODERM) 5 % 1 patch, Transdermal, Every 24 Hours, Remove & Discard patch within 12 hours or as directed by MD   • metroNIDAZOLE (FLAGYL) 500 mg, Oral, 2 Times Daily   • montelukast (SINGULAIR) 10 mg, Oral   • nicotine (NICODERM CQ) 14 MG/24HR patch 1 patch, Transdermal, Every 24 Hours   • nicotine (NICODERM CQ) 7 MG/24HR patch 1 patch, Transdermal, Every 24 Hours, Taper, start with 14mg patch   •  "ondansetron ODT (ZOFRAN-ODT) 4 mg, Translingual, Every 8 Hours PRN   • pantoprazole (PROTONIX) 20 MG EC tablet TAKE 1 TABLET BY MOUTH ONCE daily   • rizatriptan (MAXALT) 10 MG tablet rizatriptan 10 mg oral tablet take 1 tablet (10 mg) by oral route once, may repeat at 2 hour intervals; do not exceed 30 mg in 24 hours for 30 days   Suspended   • sertraline (ZOLOFT) 25 MG tablet TAKE 1 TABLET BY MOUTH EVERY DAY   • topiramate (TOPAMAX) 50 mg, Oral, Daily       The following portions of the patient's history were reviewed and updated as appropriate: allergies, current medications, past family history, past medical history, past social history, past surgical history, and problem list.    Objective   Vital Signs:   /72   Pulse 81   Temp 98.1 °F (36.7 °C) (Temporal)   Ht 154.9 cm (61\")   Wt 46.4 kg (102 lb 3.2 oz)   SpO2 98%   BMI 19.31 kg/m²     Wt Readings from Last 3 Encounters:   02/13/23 46.4 kg (102 lb 3.2 oz)   01/26/23 45.8 kg (101 lb)   01/19/23 48.1 kg (106 lb)     BP Readings from Last 3 Encounters:   02/13/23 115/72   01/26/23 120/72   01/09/23 123/82     Physical Exam  Vitals reviewed.   Constitutional:       General: She is not in acute distress.     Appearance: Normal appearance. She is not ill-appearing, toxic-appearing or diaphoretic.   HENT:      Head: Normocephalic and atraumatic.      Right Ear: External ear normal.      Left Ear: External ear normal.   Eyes:      Conjunctiva/sclera: Conjunctivae normal.   Cardiovascular:      Rate and Rhythm: Normal rate and regular rhythm.      Pulses: Normal pulses.      Heart sounds: Normal heart sounds. No murmur heard.    No friction rub. No gallop.   Pulmonary:      Effort: Pulmonary effort is normal. No respiratory distress.      Breath sounds: Normal breath sounds. No stridor. No wheezing, rhonchi or rales.   Chest:      Chest wall: No tenderness.   Abdominal:      General: Abdomen is flat.      Palpations: Abdomen is soft. There is no mass.      " Tenderness: There is no abdominal tenderness.   Musculoskeletal:      Right lower leg: No edema.      Left lower leg: No edema.   Skin:     General: Skin is warm and dry.   Neurological:      General: No focal deficit present.      Mental Status: She is alert. Mental status is at baseline.   Psychiatric:         Mood and Affect: Mood normal.         Behavior: Behavior normal.         Thought Content: Thought content normal.         Judgment: Judgment normal.         Result Review :   The following data was reviewed by: Jonny Pitts MD on 02/13/2023:  Common labs    Common Labs 5/28/22 5/28/22    1241 1241   Glucose  91   BUN  6   Creatinine  0.75   Sodium  136   Potassium  4.3   Chloride  101   Calcium  10.1   WBC 10.78    Hemoglobin 14.7    Hematocrit 44.1    Platelets 317                   Lab Results   Component Value Date    COVID19 Not Detected 07/19/2022    POCPREGUR Negative 01/26/2023    BILIRUBINUR Negative 06/10/2022       Procedures        Assessment and Plan    Diagnoses and all orders for this visit:    1. Seizure disorder (HCC) (Primary)  -     Ambulatory Referral to Neurology    2. Chronic pain of both shoulders    3. Cigar smoker    4. Trichomonal vaginitis      Seizure disorder:  -new referral placed    Shoulder pain:  -improved    Tobacco:  -I briefly counseled her on the importance of nicotine cessation    Trichomonas:  -on flagyl  -I counseled her that if she has sexual relations with previous partner again, she could once again get this STI  -I also encouraged her to tell previous sex partner, as he should be treated as well (she denies any safety concerns in regards to telling him)      There are no discontinued medications.       Follow Up   Return if symptoms worsen or fail to improve.  Patient was given instructions and counseling regarding her condition or for health maintenance advice. Please see specific information pulled into the AVS if appropriate.       Jonny Pitts  MD  02/13/23  16:09 EST

## 2023-02-14 ENCOUNTER — LAB (OUTPATIENT)
Dept: OBSTETRICS AND GYNECOLOGY | Facility: CLINIC | Age: 28
End: 2023-02-14
Payer: COMMERCIAL

## 2023-02-14 ENCOUNTER — TELEPHONE (OUTPATIENT)
Dept: OBSTETRICS AND GYNECOLOGY | Facility: CLINIC | Age: 28
End: 2023-02-14
Payer: COMMERCIAL

## 2023-02-14 DIAGNOSIS — Z01.812 ENCOUNTER FOR PREPROCEDURAL LABORATORY EXAMINATION: Primary | ICD-10-CM

## 2023-02-14 LAB
HCG INTACT+B SERPL-ACNC: <0.5 MIU/ML
T PALLIDUM IGG SER QL: NORMAL

## 2023-02-14 PROCEDURE — 86780 TREPONEMA PALLIDUM: CPT | Performed by: OBSTETRICS & GYNECOLOGY

## 2023-02-14 PROCEDURE — 84702 CHORIONIC GONADOTROPIN TEST: CPT | Performed by: NURSE PRACTITIONER

## 2023-02-15 ENCOUNTER — OFFICE VISIT (OUTPATIENT)
Dept: OBSTETRICS AND GYNECOLOGY | Facility: CLINIC | Age: 28
End: 2023-02-15
Payer: COMMERCIAL

## 2023-02-15 VITALS
DIASTOLIC BLOOD PRESSURE: 79 MMHG | WEIGHT: 102 LBS | SYSTOLIC BLOOD PRESSURE: 120 MMHG | HEIGHT: 61 IN | HEART RATE: 92 BPM | BODY MASS INDEX: 19.26 KG/M2

## 2023-02-15 DIAGNOSIS — Z30.017 NEXPLANON INSERTION: Primary | ICD-10-CM

## 2023-02-15 PROCEDURE — 11981 INSERTION DRUG DLVR IMPLANT: CPT | Performed by: NURSE PRACTITIONER

## 2023-02-15 NOTE — PROGRESS NOTES
Subdermal Contraceptive Implant Insertion Note    Sally Manuel desires a subdermal etonogestrel contraceptive implant insertion.  She has been counseled regarding the risks, benefits and alternatives to the implant.  She especially understands that her menstrual periods are expected to become irregular and unpredictable throughout the time she is using the implant.  She has no contraindications to the insertion.  Her questions have been answered.  She has fully reviewed the FDA-approved consent brochure, has signed the consent form, and wishes to proceed with the insertion today.     Current method of contraception:  no method    No LMP recorded. (Menstrual status: Other).    Beta HCG negative    Procedure Time Out Documentation       Procedure Details  The inner side of the left arm was cleaned with Betadinex3 and infiltrated with 1% lidocaine.  The contraceptive alma rosa was inserted according to the 's instructions without complications.  The alma rosa was palpable under the skin after the insertion.  The insertion site was closed with Band-Aid and a pressure dressing was applied.    Lot:  [id number]  Exp:  04/06/2025    Sally was given post-insertion instructions.  She understands that the implant must be removed at the end of three years and may be removed sooner if she wishes.    Successful insertion of nexplanon device.    Patient tolerated the procedure well without complications.

## 2023-02-16 NOTE — TELEPHONE ENCOUNTER
Patient was seen in office and advised of results. She had seen results on my chart and picked up her meds on 02/02/2023. Recall was entered and reportable disease form faxed.

## 2023-02-21 ENCOUNTER — LAB (OUTPATIENT)
Dept: OBSTETRICS AND GYNECOLOGY | Facility: CLINIC | Age: 28
End: 2023-02-21
Payer: COMMERCIAL

## 2023-02-21 LAB
HBV SURFACE AG SERPL QL IA: NORMAL
HCV AB SER DONR QL: NORMAL
HIV1+2 AB SER QL: NORMAL

## 2023-02-21 PROCEDURE — 87340 HEPATITIS B SURFACE AG IA: CPT | Performed by: OBSTETRICS & GYNECOLOGY

## 2023-02-21 PROCEDURE — 86803 HEPATITIS C AB TEST: CPT | Performed by: OBSTETRICS & GYNECOLOGY

## 2023-02-21 PROCEDURE — G0432 EIA HIV-1/HIV-2 SCREEN: HCPCS | Performed by: OBSTETRICS & GYNECOLOGY

## 2023-03-07 DIAGNOSIS — M54.42 CHRONIC LEFT-SIDED LOW BACK PAIN WITH LEFT-SIDED SCIATICA: ICD-10-CM

## 2023-03-07 DIAGNOSIS — G89.29 CHRONIC LEFT-SIDED LOW BACK PAIN WITH LEFT-SIDED SCIATICA: ICD-10-CM

## 2023-03-07 RX ORDER — LIDOCAINE 50 MG/G
1 PATCH TOPICAL EVERY 24 HOURS
Qty: 90 PATCH | Refills: 3 | Status: SHIPPED | OUTPATIENT
Start: 2023-03-07

## 2023-03-07 RX ORDER — CYCLOBENZAPRINE HCL 10 MG
10 TABLET ORAL 3 TIMES DAILY PRN
Qty: 90 TABLET | Refills: 1 | Status: SHIPPED | OUTPATIENT
Start: 2023-03-07

## 2023-03-15 RX ORDER — ALBUTEROL SULFATE 2.5 MG/3ML
SOLUTION RESPIRATORY (INHALATION)
Qty: 180 ML | Refills: 2 | Status: SHIPPED | OUTPATIENT
Start: 2023-03-15

## 2023-04-24 RX ORDER — ALBUTEROL SULFATE 2.5 MG/3ML
SOLUTION RESPIRATORY (INHALATION)
Qty: 180 ML | Refills: 2 | OUTPATIENT
Start: 2023-04-24

## 2023-05-10 RX ORDER — ALBUTEROL SULFATE 2.5 MG/3ML
2.5 SOLUTION RESPIRATORY (INHALATION) EVERY 4 HOURS PRN
Qty: 180 ML | Refills: 2 | Status: SHIPPED | OUTPATIENT
Start: 2023-05-10

## 2023-05-10 NOTE — TELEPHONE ENCOUNTER
Rx Refill Note  Requested Prescriptions     Pending Prescriptions Disp Refills   • albuterol (PROVENTIL) (2.5 MG/3ML) 0.083% nebulizer solution 180 mL 2     Sig: Take 2.5 mg by nebulization.      Last office visit with prescribing clinician: 10/27/2022   Last telemedicine visit with prescribing clinician: 4/22/2023   Next office visit with prescribing clinician: Visit date not found                         Would you like a call back once the refill request has been completed: [] Yes [] No    If the office needs to give you a call back, can they leave a voicemail: [] Yes [] No    Valeriano Snyder  05/10/23, 10:02 EDT   PATIENT CALLED STATING THAT HER PHARMACY DIDN'T HAVE ANY REFILLS

## 2023-05-18 ENCOUNTER — OFFICE VISIT (OUTPATIENT)
Dept: OBSTETRICS AND GYNECOLOGY | Facility: CLINIC | Age: 28
End: 2023-05-18
Payer: COMMERCIAL

## 2023-05-18 VITALS
HEART RATE: 87 BPM | BODY MASS INDEX: 17.94 KG/M2 | SYSTOLIC BLOOD PRESSURE: 102 MMHG | WEIGHT: 95 LBS | DIASTOLIC BLOOD PRESSURE: 71 MMHG | HEIGHT: 61 IN

## 2023-05-18 DIAGNOSIS — Z11.3 ROUTINE SCREENING FOR STI (SEXUALLY TRANSMITTED INFECTION): Primary | ICD-10-CM

## 2023-05-18 DIAGNOSIS — Z30.46 ENCOUNTER FOR SURVEILLANCE OF IMPLANTABLE SUBDERMAL CONTRACEPTIVE: ICD-10-CM

## 2023-05-18 NOTE — PROGRESS NOTES
"GYN Visit    CC:   Chief Complaint   Patient presents with   • Contraception     Nexplanon follow up and test for cure        HPI:   27 y.o. Contraception or HRT: Contraception:  Nexplanon    Here for nexplanon follow up and trich test of cure.  Denies bleeding.  Has lost some weight but thinks it is due to stress.  Desires to continue device.  Requests STI screen for GC, CT and trich.       History: PMHx, Meds, Allergies, PSHx, Social Hx, and POBHx all reviewed and updated.    Review of Systems   Constitutional: Negative.    Genitourinary: Negative.        PHYSICAL EXAM:  /71   Pulse 87   Ht 154.9 cm (60.98\")   Wt 43.1 kg (95 lb)   BMI 17.96 kg/m²      Physical Exam  Vitals and nursing note reviewed. Exam conducted with a chaperone present.   Constitutional:       Appearance: Normal appearance. She is well-developed and well-groomed.   HENT:      Head: Normocephalic.   Eyes:      Pupils: Pupils are equal, round, and reactive to light.   Genitourinary:     General: Normal vulva.      Exam position: Lithotomy position.      Pubic Area: No rash or pubic lice.       Labia:         Right: No rash, tenderness, lesion or injury.         Left: No rash, tenderness, lesion or injury.       Vagina: Normal. No foreign body. No vaginal discharge, erythema, tenderness, bleeding or lesions.      Cervix: No cervical motion tenderness or discharge.   Musculoskeletal:        Arms:    Skin:     General: Skin is warm and dry.   Neurological:      General: No focal deficit present.      Mental Status: She is alert.         ASSESSMENT AND PLAN:  Diagnoses and all orders for this visit:    1. Routine screening for STI (sexually transmitted infection) (Primary)  -     Chlamydia trachomatis, Neisseria gonorrhoeae, Trichomonas vaginalis, PCR - Swab, Cervix    2. Encounter for surveillance of implantable subdermal contraceptive        Counseling:  • Will call with any needed treatment    Follow Up:  Return for as " needed.          Gerardo Barr, APRN  05/18/2023    [facility]  [facility]  [address] [address]  Dept: [phone]  Loc: [phone]

## 2023-05-20 LAB
C TRACH RRNA SPEC QL NAA+PROBE: NEGATIVE
N GONORRHOEA RRNA SPEC QL NAA+PROBE: NEGATIVE
T VAGINALIS RRNA SPEC QL NAA+PROBE: NEGATIVE

## 2023-05-30 ENCOUNTER — TELEPHONE (OUTPATIENT)
Dept: INTERNAL MEDICINE | Facility: CLINIC | Age: 28
End: 2023-05-30

## 2023-05-30 NOTE — TELEPHONE ENCOUNTER
Called patient number it said it was out of services.   Called number that was giving her mother picked up she is NOT ON THE  [employer] COULD NOT GIVE ANY INFORMATION.   EXPLAINED TO MOTHER TO HAVE MARÍA GIVE US A CALL

## 2023-05-30 NOTE — TELEPHONE ENCOUNTER
Caller: Sally Manuel    Relationship: Self    Best call back number: [phone]    What form or medical record are you requesting: MEDICAL REVIEW FOR LICENSE    How would you like to receive the form or medical records (pick-up, mail, fax):     Timeframe paperwork needed: AS SOON AS POSSIBLE    Additional notes:   PATIENT DROPPED OFF THE PAPERS ON 05/24/2023 AND SHE IS CHECKING STATUS OF IT.

## 2023-05-31 NOTE — TELEPHONE ENCOUNTER
Called patient she is currently on the schedule for 6/6/2023 to talk with dr. allison about her vision paperwork

## 2023-05-31 NOTE — TELEPHONE ENCOUNTER
Patient called back to speak to Valeriano - was unsure what Valeriano called to tell her regarding the paperwork.     Please call patient back at [phone]

## 2023-06-05 RX ORDER — CYCLOBENZAPRINE HCL 10 MG
TABLET ORAL
Qty: 90 TABLET | Refills: 1 | OUTPATIENT
Start: 2023-06-05

## 2023-06-06 ENCOUNTER — OFFICE VISIT (OUTPATIENT)
Dept: INTERNAL MEDICINE | Facility: CLINIC | Age: 28
End: 2023-06-06
Payer: COMMERCIAL

## 2023-06-06 VITALS
HEART RATE: 86 BPM | SYSTOLIC BLOOD PRESSURE: 122 MMHG | OXYGEN SATURATION: 99 % | DIASTOLIC BLOOD PRESSURE: 67 MMHG | HEIGHT: 61 IN | BODY MASS INDEX: 19.45 KG/M2 | WEIGHT: 103 LBS | TEMPERATURE: 98.7 F

## 2023-06-06 DIAGNOSIS — G40.919 INTRACTABLE EPILEPSY WITHOUT STATUS EPILEPTICUS, UNSPECIFIED EPILEPSY TYPE: Primary | ICD-10-CM

## 2023-06-06 PROCEDURE — 1159F MED LIST DOCD IN RCRD: CPT | Performed by: INTERNAL MEDICINE

## 2023-06-06 PROCEDURE — 1160F RVW MEDS BY RX/DR IN RCRD: CPT | Performed by: INTERNAL MEDICINE

## 2023-06-06 PROCEDURE — 99213 OFFICE O/P EST LOW 20 MIN: CPT | Performed by: INTERNAL MEDICINE

## 2023-06-06 RX ORDER — TOPIRAMATE 50 MG/1
50 TABLET, FILM COATED ORAL DAILY
Qty: 90 TABLET | Refills: 1 | Status: SHIPPED | OUTPATIENT
Start: 2023-06-06

## 2023-06-06 NOTE — PROGRESS NOTES
Chief Complaint  PAPERWORK and Med Refill (Topamax)    Subjective          Sally Manuel presents to [facility]  History of Present Illness  Patient reports diagnosed with epilepsy in 2014. She is on topamax. Patient report slast seizure was in 2014. Patient had follow-up with neurology, Dr. Vann. Patient may have had seizure in summer of 2022 with some tongue biting but without LOC. Patient has been unable to follow-up with neurology as previously recommended. Patient also reports she may have seizures in her sleep as she wakes up with tongue biting and cheek biting sometimes.     Current Outpatient Medications   Medication Instructions    albuterol (PROVENTIL) 2.5 mg, Nebulization, Every 4 Hours PRN    cyclobenzaprine (FLEXERIL) 10 mg, Oral, 3 Times Daily PRN    Diclofenac Sodium 4 g, Topical    Etonogestrel (NEXPLANON) 68 MG implant subdermal implant 1 each, Intradermal, Every 3 Years    ibuprofen (ADVIL,MOTRIN) 400 mg, Oral, Every 8 Hours PRN    ketorolac (TORADOL) 10 mg, Oral, Every 6 Hours PRN    lidocaine (LIDODERM) 5 % 1 patch, Transdermal, Every 24 Hours, Remove & Discard patch within 12 hours or as directed by MD moody (SINGULAIR) 10 mg, Oral    nicotine (NICODERM CQ) 14 MG/24HR patch 1 patch, Transdermal, Every 24 Hours    nicotine (NICODERM CQ) 7 MG/24HR patch 1 patch, Transdermal, Every 24 Hours, Taper, start with 14mg patch    ondansetron ODT (ZOFRAN-ODT) 4 mg, Translingual, Every 8 Hours PRN    pantoprazole (PROTONIX) 20 MG EC tablet TAKE 1 TABLET BY MOUTH ONCE daily    rizatriptan (MAXALT) 10 MG tablet rizatriptan 10 mg oral tablet take 1 tablet (10 mg) by oral route once, may repeat at 2 hour intervals; do not exceed 30 mg in 24 hours for 30 days   Suspended    sertraline (ZOLOFT) 25 MG tablet TAKE 1 TABLET BY MOUTH EVERY DAY    topiramate (TOPAMAX) 50 mg, Oral, Daily       The following portions of the patient's history were reviewed and  "updated as appropriate: allergies, current medications, past family history, past medical history, past social history, past surgical history, and problem list.    Objective   Vital Signs:   /67 (BP Location: Left arm, Patient Position: Sitting, Cuff Size: Adult)   Pulse 86   Temp 98.7 °F (37.1 °C) (Temporal)   Ht 154.9 cm (60.98\")   Wt 46.7 kg (103 lb)   SpO2 99%   BMI 19.47 kg/m²     Wt Readings from Last 3 Encounters:   06/06/23 46.7 kg (103 lb)   05/18/23 43.1 kg (95 lb)   02/15/23 46.3 kg (102 lb)     BP Readings from Last 3 Encounters:   06/06/23 122/67   05/18/23 102/71   02/15/23 120/79     Physical Exam   Appearance: No acute distress, well-nourished  Head: normocephalic, atraumatic  Eyes: extraocular movements intact, no scleral icterus, no conjunctival injection  Ears, Nose, and Throat: external ears normal, nares patent, moist mucous membranes  Cardiovascular: regular rate and rhythm. no murmurs, rubs, or gallops. no edema  Respiratory: breathing comfortably, symmetric chest rise, clear to auscultation bilaterally. No wheezes, rales, or rhonchi.  Neuro: alert and oriented to time, place, and person. Normal gait  Psych: normal mood and affect     Result Review :   The following data was reviewed by: Micheal cOhoa Jr, MD on 06/06/2023:           Lab Results   Component Value Date    COVID19 Not Detected 07/19/2022    POCPREGUR Negative 01/26/2023    BILIRUBINUR Negative 06/10/2022          Assessment and Plan    Diagnoses and all orders for this visit:    1. 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 need to demonstrate control prior to returning 's license. obtain EEG and neuro appt to eval. cont otpamax as previously Rx'd.  Orders:  -     topiramate (TOPAMAX) 50 MG tablet; Take 1 tablet by mouth Daily.  Dispense: 90 tablet; Refill: 1  -     EEG; Future          Medications Discontinued During This " Encounter   Medication Reason    topiramate (TOPAMAX) 50 MG tablet Reorder          Follow Up   Return if symptoms worsen or fail to improve.  Patient was given instructions and counseling regarding her condition or for health maintenance advice. Please see specific information pulled into the AVS if appropriate.       Micheal Ochoa Jr, MD  06/06/23  14:37 EDT

## 2023-07-25 RX ORDER — ALBUTEROL SULFATE 2.5 MG/3ML
SOLUTION RESPIRATORY (INHALATION)
Qty: 180 ML | Refills: 2 | Status: SHIPPED | OUTPATIENT
Start: 2023-07-25

## 2023-08-17 ENCOUNTER — HOSPITAL ENCOUNTER (OUTPATIENT)
Dept: NEUROLOGY | Facility: HOSPITAL | Age: 28
Discharge: HOME | End: 2023-08-17
Admitting: INTERNAL MEDICINE
Payer: COMMERCIAL

## 2023-08-17 DIAGNOSIS — G40.919 INTRACTABLE EPILEPSY WITHOUT STATUS EPILEPTICUS, UNSPECIFIED EPILEPSY TYPE: ICD-10-CM

## 2023-08-17 PROCEDURE — 95816 EEG AWAKE AND DROWSY: CPT

## 2023-08-23 ENCOUNTER — PATIENT MESSAGE (OUTPATIENT)
Dept: INTERNAL MEDICINE | Facility: CLINIC | Age: 28
End: 2023-08-23
Payer: COMMERCIAL

## 2023-08-24 NOTE — TELEPHONE ENCOUNTER
From: Sally Manuel  To: Micheal Ochoa  Sent: 8/23/2023 7:21 PM EDT  Subject: Question regarding EEG    So what is the next step for me?

## 2023-09-28 ENCOUNTER — OFFICE VISIT (OUTPATIENT)
Dept: NEUROLOGY | Facility: CLINIC | Age: 28
End: 2023-09-28
Payer: COMMERCIAL

## 2023-09-28 ENCOUNTER — PATIENT ROUNDING (BHMG ONLY) (OUTPATIENT)
Dept: NEUROLOGY | Facility: CLINIC | Age: 28
End: 2023-09-28
Payer: COMMERCIAL

## 2023-09-28 VITALS
DIASTOLIC BLOOD PRESSURE: 73 MMHG | HEIGHT: 61 IN | BODY MASS INDEX: 19.54 KG/M2 | WEIGHT: 103.5 LBS | SYSTOLIC BLOOD PRESSURE: 128 MMHG | HEART RATE: 91 BPM

## 2023-09-28 DIAGNOSIS — Z87.898 HISTORY OF SEIZURE: Primary | ICD-10-CM

## 2023-09-28 NOTE — PATIENT INSTRUCTIONS
Seizure, Adult  A seizure is a sudden burst of abnormal electrical and chemical activity in the brain. Seizures usually last from 30 seconds to 2 minutes. The abnormal activity temporarily interrupts normal brain function.  Many types of seizures can affect adults. A seizure can cause many different symptoms depending on where in the brain it starts.  What are the causes?  Common causes of this condition include:  Fever or infection.  Brain injury, head trauma, bleeding in the brain, or a brain tumor.  Low levels of blood sugar or salt (sodium).  Kidney problems or liver problems.  Metabolic disorders or other conditions that are passed from parent to child (are inherited).  Reaction to a substance, such as a drug or a medicine, or suddenly stopping the use of a substance (withdrawal).  A stroke.  Developmental disorders such as autism spectrum disorder or cerebral palsy.  In some cases, the cause of a seizure may not be known. Some people who have a seizure never have another one. A person who has repeated seizures over time without a clear cause has a condition called epilepsy.  What increases the risk?  You are more likely to develop this condition if:  You have a family history of epilepsy.  You have had a tonic-clonic seizure before. This type of seizure causes tightening (contraction) of the muscles of the whole body and loss of consciousness.  You have a history of head trauma, lack of oxygen at birth, or strokes.  What are the signs or symptoms?  There are many different types of seizures. The symptoms vary depending on the type of seizure you have. Symptoms occur during the seizure. They may also occur before a seizure (aura) and after a seizure (postictal). Symptoms may include the following:  Symptoms during a seizure  Uncontrollable shaking (convulsions) with fast, jerky movements of muscles.  Stiffening of the body.  Breathing problems.  Confusion, staring, or unresponsiveness.  Head nodding, eye  blinking or fluttering, or rapid eye movements.  Drooling, grunting, or making clicking sounds with your mouth.  Loss of bladder control and bowel control.  Symptoms before a seizure  Fear or anxiety.  Nausea.  Vertigo. This is a feeling like:  You are moving when you are not.  Your surroundings are moving when they are not.  Déjà vu. This is a feeling of having seen or heard something before.  Odd tastes or smells.  Changes in vision, such as seeing flashing lights or spots.  Symptoms after a seizure  Confusion.  Sleepiness.  Headache.  Sore muscles.  How is this diagnosed?  This condition may be diagnosed based on:  A description of your symptoms. Video of your seizures can be helpful.  Your medical history.  A physical exam.  You may also have tests, including:  Blood tests.  CT scan.  MRI.  Electroencephalogram (EEG). This test measures electrical activity in the brain. An EEG can predict whether seizures will return.  A spinal tap, also called a lumbar puncture. This is the removal and testing of fluid that surrounds the brain and spinal cord.  How is this treated?  Most seizures will stop on their own in less than 5 minutes, and no treatment is needed. Seizures that last longer than 5 minutes will usually need treatment.  Seizures may be treated with:  Medicines given through an IV.  Avoiding known triggers, such as medicines that you take for another condition.  Medicines to control seizures or prevent future seizures (antiepileptics), if epilepsy caused your seizures.  Medical devices to prevent and control seizures.  Surgery to stop seizures or to reduce how often seizures happen, if you have epilepsy that does not respond to medicines.  A diet low in carbohydrates and high in fat (ketogenic diet).  Follow these instructions at home:  Medicines  Take over-the-counter and prescription medicines only as told by your health care provider.  Avoid any substances that may prevent your medicine from working  properly, such as alcohol.  Activity  Follow instructions about activities, such as driving or swimming, that would be dangerous if you had another seizure. Wait until your health care provider says it is safe to do them.  If you live in the [place] check with your local department of motor vehicles (DMV) to find out about local driving laws. Each state has specific rules about when you can legally drive again.  Get enough rest. Lack of sleep can make seizures more likely to occur.  Educating others    Teach friends and family what to do if you have a seizure. They should:  Help you get down to the ground, to prevent a fall.  Cushion your head and move items away from your body.  Loosen any tight clothing around your neck.  Turn you on your side. If you vomit, this helps keep your airway clear.  Know whether or not you need emergency care.  Stay with you until you recover.  Also, tell them what not to do if you have a seizure. Tell them:  They should not hold you down. Holding you down will not stop the seizure.  They should not put anything in your mouth.  General instructions  Avoid anything that has ever triggered a seizure for you.  Keep a seizure diary. Record what you remember about each seizure, especially anything that might have triggered it.  Keep all follow-up visits. This is important.  Contact a health care provider if:  You have another seizure or seizures. Call each time you have a seizure.  Your seizure pattern changes.  You continue to have seizures with treatment.  You have symptoms of an infection or illness. Either of these might increase your risk of having a seizure.  You are unable to take your medicine.  Get help right away if:  You have:  A seizure that does not stop after 5 minutes.  Several seizures in a row without a complete recovery between seizures.  A seizure that makes it harder to breathe.  A seizure that leaves you unable to speak or use a part of your body.  You do not wake up right  away after a seizure.  You injure yourself during a seizure.  You have confusion or pain right after a seizure.  These symptoms may represent a serious problem that is an emergency. Do not wait to see if the symptoms will go away. Get medical help right away. Call your local emergency services (911 in the [place] Do not drive yourself to the hospital.  Summary  Seizures are caused by abnormal electrical and chemical activity in the brain. The activity disrupts normal brain function and can cause various symptoms.  Seizures have many causes, including illness, head injuries, low levels of blood sugar or salt, and certain conditions.  Most seizures will stop on their own in less than 5 minutes. Seizures that last longer than 5 minutes are a medical emergency and need treatment right away.  Many medicines are used to treat seizures. Take over-the-counter and prescription medicines only as told by your health care provider.  This information is not intended to replace advice given to you by your health care provider. Make sure you discuss any questions you have with your health care provider.  Document Revised: 06/25/2021 Document Reviewed: 06/25/2021  Elsevier Patient Education © 2023 [employer]

## 2023-09-28 NOTE — PROGRESS NOTES
"Chief Complaint  Neurologic Problem    Subjective          Sally Manuel presents to [facility]  History of Present Illness  History of seizure, once when a child and once in 2014 that she is aware of.  States that she previously saw Dr. Vann.  With the spell in 2014, she states that the ER said her blood sugar dropped too low and caused the spell.  Has had several times that she has noticed that she bit her jaw or tongue while sleeping.  The last episode was at the beginning of 2023.  States she clenches her jaw and grinds her teeth at night.  Has mouthguard but hasn't had to use it recently.     Objective   Vital Signs:   /73   Pulse 91   Ht 154.9 cm (60.98\")   Wt 46.9 kg (103 lb 8 oz)   BMI 19.57 kg/m²     Physical Exam  HENT:      Head: Normocephalic.   Pulmonary:      Effort: Pulmonary effort is normal.   Neurological:      Mental Status: She is alert and oriented to person, place, and time.      Sensory: Sensation is intact.      Motor: Motor function is intact.      Coordination: Coordination is intact.      Deep Tendon Reflexes: Reflexes are normal and symmetric.      Neurologic Exam     Mental Status   Oriented to person, place, and time.      Result Review :             EEG: Normal EEG during the waking and light stages of sleep.  Photic stimulation did not activate any abnormalities.  There were no epileptiform discharges noted today.    CT Head:   Negative CT scan of the head without IV contrast.         Assessment and Plan    Diagnoses and all orders for this visit:    1. History of seizure (Primary)  Assessment & Plan:  No evidence that she is epileptic.  EEG normal.  Only history of 2 seizure like events previously. One reportedly provided by hypoglycemia.  Is on subtherapeutic dose of topiramate for seizure control.  States she is taking for migraine prophylaxis.  Discussed that she can follow-up with neurology on PRN basis if she has any " increase in seizure like events or concerns.     In general, we recommend using good judgement when you are doing certain activities and to avoid those activities that if you were to have a seizure, you could harm yourself or others. In the [facility], it is the law that you cannot drive within 90 days of a seizure. We also recommend not standing over open flames, not getting on high ladders or the roof, not swimming or taking baths by yourself (showers are ok) and not operating heavy machinery or power tools.           I spent 30 minutes caring for Sally on this date of service. This time includes time spent by me in the following activities:preparing for the visit, reviewing tests, obtaining and/or reviewing a separately obtained history, performing a medically appropriate examination and/or evaluation , counseling and educating the patient/family/caregiver, ordering medications, tests, or procedures, documenting information in the medical record, and independently interpreting results and communicating that information with the patient/family/caregiver  Follow Up   Return if symptoms worsen or fail to improve.  Patient was given instructions and counseling regarding her condition or for health maintenance advice. Please see specific information pulled into the AVS if appropriate.

## 2023-10-03 PROBLEM — G40.909 EPILEPSY: Status: RESOLVED | Noted: 2021-11-09 | Resolved: 2023-10-03

## 2023-10-03 PROBLEM — Z87.898 HISTORY OF SEIZURE: Status: ACTIVE | Noted: 2023-10-03

## 2023-10-03 NOTE — ASSESSMENT & PLAN NOTE
No evidence that she is epileptic.  EEG normal.  Only history of 2 seizure like events previously. One reportedly provided by hypoglycemia.  Is on subtherapeutic dose of topiramate for seizure control.  States she is taking for migraine prophylaxis.  Discussed that she can follow-up with neurology on PRN basis if she has any increase in seizure like events or concerns.     In general, we recommend using good judgement when you are doing certain activities and to avoid those activities that if you were to have a seizure, you could harm yourself or others. In the [facility], it is the law that you cannot drive within 90 days of a seizure. We also recommend not standing over open flames, not getting on high ladders or the roof, not swimming or taking baths by yourself (showers are ok) and not operating heavy machinery or power tools.

## 2023-10-09 ENCOUNTER — TELEPHONE (OUTPATIENT)
Dept: INTERNAL MEDICINE | Facility: CLINIC | Age: 28
End: 2023-10-09
Payer: COMMERCIAL

## 2023-10-09 DIAGNOSIS — F33.9 EPISODE OF RECURRENT MAJOR DEPRESSIVE DISORDER, UNSPECIFIED DEPRESSION EPISODE SEVERITY: ICD-10-CM

## 2023-10-09 NOTE — TELEPHONE ENCOUNTER
Patient brought in paperwork to have her drivers license reinstated, patient saw her neurologist last week and wanted to know if you still had this paperwork and if you fill it out or if her neurologist will fill it out.

## 2023-10-09 NOTE — TELEPHONE ENCOUNTER
Caller: Sally Manuel    Relationship: Self    Best call back number: [phone]     What form or medical record are you requesting:   MEDICAL REVIEW FORMS TO REINSTATED DRIVERS LICENSE    How would you like to receive the form or medical records (pick-up, mail, fax): FAX  If fax, what is the fax number:   PATIENT STATED THAT THE FAX NUMBER IS ON THE FORM    Timeframe paperwork needed: ASAP, DEAD LINE OF 10/11/23    Additional notes: PATIENT STATED THAT THIS WAS DROOPED OFF A COUPLE OF MONTHS AGO AND SHE IS NEEDING TO KNOW THE STATUS AND IF THIS WAS GOING TO BE COMPLETED BY PRIMARY CARE OR NEUROLOGY.

## 2023-10-11 ENCOUNTER — TELEPHONE (OUTPATIENT)
Dept: INTERNAL MEDICINE | Facility: CLINIC | Age: 28
End: 2023-10-11
Payer: COMMERCIAL

## 2023-10-11 NOTE — TELEPHONE ENCOUNTER
Call transferred to me after hub was told Shy was out of office this week.  Pt stated she had dropped paperwork off in June to be completed for her to get her drivers license back.  Was very upset they weren't done because today was the deadline.  Per Telephone encounters I reminded pt of exchange with Dr cOhoa in August where he told her that would need to be done by her neurologist.  Pt had an appt 09/07/2023 in which she might have discussed that with their office.  I did look for any paperwork the pt dropped off & was unable to locate any.  Told pt I would be asking Dr Ochoa about her paperwork but advised her to contact her neurologist office.

## 2023-10-13 RX ORDER — RIZATRIPTAN BENZOATE 10 MG/1
10 TABLET ORAL ONCE AS NEEDED
Qty: 10 TABLET | Refills: 0 | Status: SHIPPED | OUTPATIENT
Start: 2023-10-13

## 2023-10-13 RX ORDER — SERTRALINE HYDROCHLORIDE 25 MG/1
25 TABLET, FILM COATED ORAL DAILY
Qty: 90 TABLET | Refills: 1 | Status: SHIPPED | OUTPATIENT
Start: 2023-10-13

## 2023-10-13 RX ORDER — CYCLOBENZAPRINE HCL 10 MG
10 TABLET ORAL 3 TIMES DAILY PRN
Qty: 90 TABLET | Refills: 1 | Status: SHIPPED | OUTPATIENT
Start: 2023-10-13

## 2023-10-13 RX ORDER — MONTELUKAST SODIUM 10 MG/1
10 TABLET ORAL NIGHTLY
Qty: 90 TABLET | Refills: 1 | Status: SHIPPED | OUTPATIENT
Start: 2023-10-13

## 2023-10-23 ENCOUNTER — TELEPHONE (OUTPATIENT)
Dept: NEUROLOGY | Facility: CLINIC | Age: 28
End: 2023-10-23
Payer: COMMERCIAL

## 2023-10-23 NOTE — TELEPHONE ENCOUNTER
Left voicemail to pt to return call to office and ask for Najma to discuss type of paperwork needed.

## 2023-10-23 NOTE — TELEPHONE ENCOUNTER
Provider: VI    Caller: MARÍA    Relationship to Patient: SELF    Phone Number: [phone]    Reason for Call: PATIENT IS REQUESTING A CALL BACK DISCUSS MEDICAL REVIEW PAPERWORK AND THE PROCESS OF IT.    PLEASE REVIEW    THANK YOU

## 2023-10-23 NOTE — TELEPHONE ENCOUNTER
Spoke with pt on the phone. Stated the paperwork she needs filled out is to get her drivers license back.  License was taken from her in May 2023. Stated she dropped off paperwork at [facility] office and they lost it and when she called about it they said that the neurologist would need to fill it out.   Pt could not recall exactly why license was taken except that she was pulled over by police at some point and was told that she was driving with a suspended license and a medical review needed to be completed.     Please advise.

## 2023-10-24 ENCOUNTER — PATIENT MESSAGE (OUTPATIENT)
Dept: INTERNAL MEDICINE | Facility: CLINIC | Age: 28
End: 2023-10-24
Payer: COMMERCIAL

## 2023-10-26 NOTE — TELEPHONE ENCOUNTER
From: Sally Manuel  To: Micheal Ochoa  Sent: 10/24/2023 9:42 AM EDT  Subject: Paper Work    from Cindy S, sent October 23 at 12:21 PM    Cindy NUNEZ  Oct 23, 12:21 PM  Hello. To follow-up with you from our phone call. Cathleen advises you have Dr. Ochoa's office fill out the paperwork. He should have access to the information needed. Thank you.     That message is from the neurology office yesterday. I just need one of you all to fill it out. Not point the finger at each other! I've seen both of you all. Why is this such a problem? I've literally been waiting since June to find out if I would be able to get my License back. I went to both appointments,and now this is holding up my process even more. I'm about to lose my job because I can't get there everyday on my own. Depending on other people for rides isn't a big choice I have! So it would be nice if you all could come to a mutual understanding of who's doing what! I don't mean to be rude or disrespectful in anyway I'm just frustrated with this whole situation.
